# Patient Record
Sex: FEMALE | Race: BLACK OR AFRICAN AMERICAN | Employment: FULL TIME | URBAN - METROPOLITAN AREA
[De-identification: names, ages, dates, MRNs, and addresses within clinical notes are randomized per-mention and may not be internally consistent; named-entity substitution may affect disease eponyms.]

---

## 2017-01-10 ENCOUNTER — OFFICE VISIT (OUTPATIENT)
Dept: INTERNAL MEDICINE CLINIC | Facility: CLINIC | Age: 47
End: 2017-01-10

## 2017-01-10 DIAGNOSIS — M79.669 CALF PAIN, UNSPECIFIED LATERALITY: Primary | ICD-10-CM

## 2017-01-10 NOTE — PROGRESS NOTES
CHIEF COMPLAINT:     No chief complaint on file. HPI:   Cristo Romano is a 42year old female .       Current Outpatient Prescriptions:  DIAZEPAM 5 MG Oral Tab TAKE 1 TABLET BY MOUTH EVERY 8 HOURS AS NEEDED Disp: 30 tablet Rfl: 0   ATORVASTATIN UZAIR Normal delivery 9816,1732,9037,0308     x4   • Anemia    • Ectopic pregnancy    • Migraines    • Brain mass 2009     Reported by patient      Social History:    Smoking Status: Never Smoker                      Smokeless Status: Never Used

## 2017-02-21 ENCOUNTER — TELEPHONE (OUTPATIENT)
Dept: NEUROLOGY | Facility: CLINIC | Age: 47
End: 2017-02-21

## 2017-02-21 ENCOUNTER — OFFICE VISIT (OUTPATIENT)
Dept: NEUROLOGY | Facility: CLINIC | Age: 47
End: 2017-02-21

## 2017-02-21 VITALS
BODY MASS INDEX: 43 KG/M2 | SYSTOLIC BLOOD PRESSURE: 110 MMHG | RESPIRATION RATE: 18 BRPM | WEIGHT: 293 LBS | DIASTOLIC BLOOD PRESSURE: 70 MMHG | HEART RATE: 80 BPM

## 2017-02-21 DIAGNOSIS — Z86.018 S/P RESECTION OF MENINGIOMA: ICD-10-CM

## 2017-02-21 DIAGNOSIS — Z98.890 S/P RESECTION OF MENINGIOMA: ICD-10-CM

## 2017-02-21 DIAGNOSIS — G43.009 MIGRAINE WITHOUT AURA AND WITHOUT STATUS MIGRAINOSUS, NOT INTRACTABLE: Primary | ICD-10-CM

## 2017-02-21 PROCEDURE — 99213 OFFICE O/P EST LOW 20 MIN: CPT | Performed by: OTHER

## 2017-02-21 PROCEDURE — 96372 THER/PROPH/DIAG INJ SC/IM: CPT | Performed by: OTHER

## 2017-02-21 RX ORDER — TOPIRAMATE 50 MG/1
50 TABLET, FILM COATED ORAL 2 TIMES DAILY
Qty: 60 TABLET | Refills: 2 | Status: SHIPPED | OUTPATIENT
Start: 2017-02-21 | End: 2017-05-24

## 2017-02-21 RX ORDER — ONDANSETRON 4 MG/1
4 TABLET, ORALLY DISINTEGRATING ORAL EVERY 8 HOURS PRN
Qty: 30 TABLET | Refills: 1 | Status: SHIPPED | OUTPATIENT
Start: 2017-02-21 | End: 2019-04-25 | Stop reason: ALTCHOICE

## 2017-02-21 RX ORDER — KETOROLAC TROMETHAMINE 30 MG/ML
30 INJECTION, SOLUTION INTRAMUSCULAR; INTRAVENOUS ONCE
Status: COMPLETED | OUTPATIENT
Start: 2017-02-21 | End: 2017-02-21

## 2017-02-21 RX ORDER — DEXAMETHASONE SODIUM PHOSPHATE 10 MG/ML
10 INJECTION, SOLUTION INTRAMUSCULAR; INTRAVENOUS ONCE
Status: COMPLETED | OUTPATIENT
Start: 2017-02-21 | End: 2017-02-21

## 2017-02-21 RX ADMIN — DEXAMETHASONE SODIUM PHOSPHATE 10 MG: 10 INJECTION, SOLUTION INTRAMUSCULAR; INTRAVENOUS at 13:45:00

## 2017-02-21 RX ADMIN — KETOROLAC TROMETHAMINE 30 MG: 30 INJECTION, SOLUTION INTRAMUSCULAR; INTRAVENOUS at 13:46:00

## 2017-02-21 NOTE — PATIENT INSTRUCTIONS
Refill policies:    • Allow 2 business days for refills; controlled substances may take longer.   • Contact your pharmacy at least 5 days prior to running out of medication and have them send an electronic request or submit request through the “request re your physician has recommended that you have a procedure or additional testing performed. [facility]) will contact your insurance carrier to obtain pre-certification or prior authorization.     Unfortunately, IMTIAZ has seen an increas

## 2017-02-21 NOTE — PROGRESS NOTES
HPI:    Patient ID: Angus Prasad is a 42year old female. HPI     Speedy Haines is a 42year old female who presented with complaints of headache. She follows with me for migraine headaches.  She had left parietal meningioma resection done last September 20 Yes           0.0 oz/week       0 Standard drinks or equivalent per week       Comment: social       Review of Systems   Constitutional: Negative. Eyes: Positive for photophobia. Respiratory: Negative. Cardiovascular: Negative.     Gastrointestinal: is warm and dry. Psychiatric:  normal mood and affect. Neurological  Mental status: Awake, alert and oriented to time, place and person. Speech is fluent with intact comprehension, repetition and naming. Memory is intact.   Cranial nerves II- XII: madelaine

## 2017-02-22 NOTE — TELEPHONE ENCOUNTER
Dr. Raf Blanchard uncertain why pt is requesting FMLA, and requests that we call the patient to get more information. Spoke with patient, who states this is an ongoing FMLA, which is due to  in the next 1-2 months.   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

## 2017-02-22 NOTE — TELEPHONE ENCOUNTER
FMLA form initiated and endorsed to Dr. Connor Tomlinson for completed. OV notes from 2/21/17 printed and attached.

## 2017-02-23 NOTE — TELEPHONE ENCOUNTER
Spoke with patient who states she would like completed form mailed to her at her home address. Verified home address. Original copy placed in outgoing mail. Copy of completed FMLA form sent to scanning, along with signed SANDRA.

## 2017-05-08 RX ORDER — VALACYCLOVIR HYDROCHLORIDE 500 MG/1
TABLET, FILM COATED ORAL
Qty: 30 TABLET | Refills: 0 | Status: SHIPPED | OUTPATIENT
Start: 2017-05-08 | End: 2017-06-05

## 2017-05-22 ENCOUNTER — OFFICE VISIT (OUTPATIENT)
Dept: INTERNAL MEDICINE CLINIC | Facility: CLINIC | Age: 47
End: 2017-05-22

## 2017-05-22 VITALS
BODY MASS INDEX: 44 KG/M2 | OXYGEN SATURATION: 99 % | RESPIRATION RATE: 16 BRPM | SYSTOLIC BLOOD PRESSURE: 110 MMHG | DIASTOLIC BLOOD PRESSURE: 80 MMHG | TEMPERATURE: 98 F | WEIGHT: 293 LBS | HEART RATE: 92 BPM

## 2017-05-22 DIAGNOSIS — F43.9 STRESS AT HOME: ICD-10-CM

## 2017-05-22 DIAGNOSIS — R06.02 SOB (SHORTNESS OF BREATH): ICD-10-CM

## 2017-05-22 DIAGNOSIS — R60.0 LOCALIZED EDEMA: Primary | ICD-10-CM

## 2017-05-22 DIAGNOSIS — F41.9 ANXIETY: ICD-10-CM

## 2017-05-22 PROCEDURE — 99214 OFFICE O/P EST MOD 30 MIN: CPT | Performed by: FAMILY MEDICINE

## 2017-05-22 RX ORDER — TRIAMTERENE AND HYDROCHLOROTHIAZIDE 37.5; 25 MG/1; MG/1
1 CAPSULE ORAL EVERY MORNING
Qty: 30 CAPSULE | Refills: 1 | Status: SHIPPED | OUTPATIENT
Start: 2017-05-22 | End: 2019-04-25 | Stop reason: ALTCHOICE

## 2017-05-22 NOTE — PROGRESS NOTES
CHIEF COMPLAINT:     Patient presents with:  Leg Swelling: legs and feet   Headache      HPI:   Bambi Luna is a 42year old female . The patient complains of swelling of the lower legs. Has had on and off for years.  Worsens as the day goes on and is w NEEDED Disp: 30 tablet Rfl: 0   ATORVASTATIN CALCIUM 20 MG Oral Tab TAKE 1 TABLET BY MOUTH NIGHTLY. Disp: 30 tablet Rfl: 0   ergocalciferol 82116 UNITS Oral Cap Take 1 capsule (50,000 Units total) by mouth once a week.  Disp: 12 capsule Rfl: 1   ondansetron normocephalic  EYES: conjunctiva clear, EOM intact, PERRLA  EARS: TM's clear, no bulging, no retraction, no fluid  NOSE: nostrils patent, no exudates, nasal mucosa pink and noninflamed  THROAT: oral mucosa pink, moist. No visible dental caries.  Posterior p

## 2017-05-24 ENCOUNTER — OFFICE VISIT (OUTPATIENT)
Dept: NEUROLOGY | Facility: CLINIC | Age: 47
End: 2017-05-24

## 2017-05-24 VITALS
BODY MASS INDEX: 41.02 KG/M2 | WEIGHT: 293 LBS | DIASTOLIC BLOOD PRESSURE: 84 MMHG | RESPIRATION RATE: 16 BRPM | SYSTOLIC BLOOD PRESSURE: 133 MMHG | HEART RATE: 74 BPM | HEIGHT: 71 IN

## 2017-05-24 DIAGNOSIS — G43.009 MIGRAINE WITHOUT AURA AND WITHOUT STATUS MIGRAINOSUS, NOT INTRACTABLE: Primary | ICD-10-CM

## 2017-05-24 DIAGNOSIS — Z86.018 S/P RESECTION OF MENINGIOMA: ICD-10-CM

## 2017-05-24 DIAGNOSIS — Z98.890 S/P RESECTION OF MENINGIOMA: ICD-10-CM

## 2017-05-24 PROCEDURE — 96372 THER/PROPH/DIAG INJ SC/IM: CPT | Performed by: OTHER

## 2017-05-24 PROCEDURE — 99213 OFFICE O/P EST LOW 20 MIN: CPT | Performed by: OTHER

## 2017-05-24 RX ORDER — KETOROLAC TROMETHAMINE 30 MG/ML
30 INJECTION, SOLUTION INTRAMUSCULAR; INTRAVENOUS ONCE
Status: COMPLETED | OUTPATIENT
Start: 2017-05-24 | End: 2017-05-24

## 2017-05-24 RX ORDER — DEXAMETHASONE SODIUM PHOSPHATE 10 MG/ML
10 INJECTION, SOLUTION INTRAMUSCULAR; INTRAVENOUS ONCE
Status: COMPLETED | OUTPATIENT
Start: 2017-05-24 | End: 2017-05-24

## 2017-05-24 RX ORDER — TOPIRAMATE 50 MG/1
50 TABLET, FILM COATED ORAL 2 TIMES DAILY
Qty: 60 TABLET | Refills: 5 | Status: SHIPPED | OUTPATIENT
Start: 2017-05-24 | End: 2018-02-01

## 2017-05-24 RX ORDER — SUMATRIPTAN 50 MG/1
50 TABLET, FILM COATED ORAL EVERY 2 HOUR PRN
Qty: 9 TABLET | Refills: 0 | Status: SHIPPED | OUTPATIENT
Start: 2017-05-24 | End: 2017-09-14

## 2017-05-24 RX ORDER — ONDANSETRON 4 MG/1
4 TABLET, FILM COATED ORAL EVERY 8 HOURS PRN
Qty: 60 TABLET | Refills: 2 | Status: SHIPPED | OUTPATIENT
Start: 2017-05-24 | End: 2019-02-07

## 2017-05-24 RX ADMIN — DEXAMETHASONE SODIUM PHOSPHATE 10 MG: 10 INJECTION, SOLUTION INTRAMUSCULAR; INTRAVENOUS at 16:37:00

## 2017-05-24 RX ADMIN — KETOROLAC TROMETHAMINE 30 MG: 30 INJECTION, SOLUTION INTRAMUSCULAR; INTRAVENOUS at 16:36:00

## 2017-05-24 NOTE — PROGRESS NOTES
Provided IM injections of toradol and decadron. Pt signed procedure consent form prior to administration. Pt tolerated well.

## 2017-05-25 NOTE — PROGRESS NOTES
HPI:    Patient ID: Robby Benavidez is a 42year old female. Migraine   Associated symptoms include nausea and photophobia. Pertinent negatives include no dizziness, numbness or weakness.         Antonio is a 42year old female who presented for follow up f per week       Comment: social       Review of Systems   Constitutional: Negative. Eyes: Positive for photophobia. Respiratory: Negative. Cardiovascular: Negative. Gastrointestinal: Positive for nausea. Musculoskeletal: Negative.     Skin: Nega developed, well nourished, mild painful distress  Head: s/p left parietal craniotomy  Neck: Normal range of motion. Neck supple. Cardiovascular: Normal rate, regular rhythm and normal heart sounds.     Pulmonary/Chest: Effort normal and breath sounds norm mg tablet 60 tablet 2      Sig: Take 1 tablet (4 mg total) by mouth every 8 (eight) hours as needed for Nausea.            Imaging & Referrals:  None     VR#[id number]

## 2017-06-05 RX ORDER — DIAZEPAM 5 MG/1
TABLET ORAL
Qty: 30 TABLET | Refills: 0 | Status: SHIPPED | OUTPATIENT
Start: 2017-06-05 | End: 2017-09-11

## 2017-06-05 RX ORDER — VALACYCLOVIR HYDROCHLORIDE 500 MG/1
500 TABLET, FILM COATED ORAL
Qty: 30 TABLET | Refills: 0 | Status: SHIPPED | OUTPATIENT
Start: 2017-06-05 | End: 2017-09-08

## 2017-06-05 RX ORDER — ATORVASTATIN CALCIUM 20 MG/1
TABLET, FILM COATED ORAL
Qty: 30 TABLET | Refills: 0 | Status: SHIPPED | OUTPATIENT
Start: 2017-06-05 | End: 2017-09-20

## 2017-07-14 ENCOUNTER — TELEPHONE (OUTPATIENT)
Dept: INTERNAL MEDICINE CLINIC | Facility: CLINIC | Age: 47
End: 2017-07-14

## 2017-07-14 ENCOUNTER — HOSPITAL ENCOUNTER (OUTPATIENT)
Dept: MAMMOGRAPHY | Age: 47
Discharge: HOME | End: 2017-07-14
Attending: FAMILY MEDICINE
Payer: COMMERCIAL

## 2017-07-14 DIAGNOSIS — Z12.31 ENCOUNTER FOR SCREENING MAMMOGRAM FOR BREAST CANCER: Primary | ICD-10-CM

## 2017-07-14 DIAGNOSIS — Z13.820 SCREENING FOR OSTEOPOROSIS: ICD-10-CM

## 2017-07-14 DIAGNOSIS — Z11.3 SCREENING FOR STD (SEXUALLY TRANSMITTED DISEASE): ICD-10-CM

## 2017-07-14 DIAGNOSIS — Z12.31 ENCOUNTER FOR SCREENING MAMMOGRAM FOR BREAST CANCER: ICD-10-CM

## 2017-07-14 DIAGNOSIS — Z00.00 LABORATORY EXAMINATION ORDERED AS PART OF A ROUTINE GENERAL MEDICAL EXAMINATION: ICD-10-CM

## 2017-07-14 DIAGNOSIS — Z12.31 ENCOUNTER FOR SCREENING MAMMOGRAM FOR MALIGNANT NEOPLASM OF BREAST: ICD-10-CM

## 2017-07-14 PROCEDURE — 77067 SCR MAMMO BI INCL CAD: CPT | Performed by: FAMILY MEDICINE

## 2017-07-14 NOTE — TELEPHONE ENCOUNTER
Pt called to request blood work including a prolactin level/ mammogram and bone density. Pt due for mammo, order placed. Advised pt to schedule an ov w Amanda Fullerw and stated will do once tests are completed.    Pt upset as the requested test were not placed

## 2017-07-17 NOTE — TELEPHONE ENCOUNTER
I ordered what STD labs there are but the rest of the STD testing will occur when she comes in for her pap smear, because that testing is done with vaginal cultures.

## 2017-07-17 NOTE — TELEPHONE ENCOUNTER
Pt informed and is also requesting all STD testing, including HIV testing. She also stated that she would like a PAP (did state that she had a hysterectomy but did not state if partial or full). Advised that she would need to come in for OV.      Please

## 2017-09-11 DIAGNOSIS — Z86.018 S/P RESECTION OF MENINGIOMA: ICD-10-CM

## 2017-09-11 DIAGNOSIS — G43.009 MIGRAINE WITHOUT AURA AND WITHOUT STATUS MIGRAINOSUS, NOT INTRACTABLE: ICD-10-CM

## 2017-09-11 DIAGNOSIS — Z98.890 S/P RESECTION OF MENINGIOMA: ICD-10-CM

## 2017-09-11 RX ORDER — VALACYCLOVIR HYDROCHLORIDE 500 MG/1
TABLET, FILM COATED ORAL
Qty: 30 TABLET | Refills: 0 | Status: SHIPPED | OUTPATIENT
Start: 2017-09-11 | End: 2017-09-11

## 2017-09-11 RX ORDER — VALACYCLOVIR HYDROCHLORIDE 500 MG/1
500 TABLET, FILM COATED ORAL
Qty: 30 TABLET | Refills: 3 | Status: SHIPPED
Start: 2017-09-11 | End: 2018-05-08

## 2017-09-11 RX ORDER — DIAZEPAM 5 MG/1
5 TABLET ORAL EVERY 8 HOURS PRN
Qty: 30 TABLET | Refills: 0 | COMMUNITY
Start: 2017-09-11 | End: 2017-10-07

## 2017-09-11 NOTE — TELEPHONE ENCOUNTER
From: Tl Gloss  Sent: 9/11/2017 8:52 AM CDT  Subject: Medication Renewal Request    Osmar Ryan  Dhruv Colon would like a refill of the following medications:  Isometheptene-Dichloral-APAP -325 MG Oral Cap Muriel Sorto MD]    Preferred pharmacy: [facility] [address], [phone], [phone]    Comment:      Medication renewals requested in this message routed separately:  DIAZEPAM 5 MG Oral Tab Precious Banda NP]  VALACYCLOVIR  MG Oral Tab Emil Adame MD]

## 2017-09-11 NOTE — TELEPHONE ENCOUNTER
Faxed Rx script (Diazepam 5mg) to [facility] [address] ([facility] #[phone]).

## 2017-09-11 NOTE — TELEPHONE ENCOUNTER
From: Neil Flores  Sent: 9/11/2017 8:52 AM CDT  Subject: Medication Renewal Request    Mathieu Wlison.  Tonie Ramirez would like a refill of the following medications:  DIAZEPAM 5 MG Oral Tab Wilfrid Solis NP]    Preferred pharmacy: [facility]

## 2017-09-11 NOTE — TELEPHONE ENCOUNTER
From: Jamila Tinoco  Sent: 9/11/2017 8:52 AM CDT  Subject: Medication Renewal Request    Philip Mcwilliams.  [facility] would like a refill of the following medications:  VALACYCLOVIR  MG Oral Tab Donnel Mohs, MD]    Preferred pharmacy: [place] [phone]

## 2017-09-13 NOTE — TELEPHONE ENCOUNTER
From: Mony Irvin  Sent: 9/11/2017 9:19 PM CDT  Subject: Medication Renewal Request    Babs Bone.  Schuyler Cockayne would like a refill of the following medications:  Isometheptene-Dichloral-APAP -325 MG Oral Cap Lynn Mitchell MD]    Preferred pharmacy: [facility]

## 2017-09-13 NOTE — TELEPHONE ENCOUNTER
Medication: Midrin    Date of last refill: 02/21/17  [place]    Last office visit: 05/24/17  Due back to clinic per last office note:  3 months  Date next office visit scheduled: 09/13/17    Last OV note recommendati

## 2017-09-14 ENCOUNTER — TELEPHONE (OUTPATIENT)
Dept: NEUROLOGY | Facility: CLINIC | Age: 47
End: 2017-09-14

## 2017-09-14 ENCOUNTER — OFFICE VISIT (OUTPATIENT)
Dept: NEUROLOGY | Facility: CLINIC | Age: 47
End: 2017-09-14

## 2017-09-14 ENCOUNTER — HOSPITAL ENCOUNTER (OUTPATIENT)
Dept: MAMMOGRAPHY | Age: 47
Discharge: HOME | End: 2017-09-14
Attending: FAMILY MEDICINE
Payer: COMMERCIAL

## 2017-09-14 ENCOUNTER — LAB ENCOUNTER (OUTPATIENT)
Dept: LAB | Age: 47
End: 2017-09-14
Attending: FAMILY MEDICINE
Payer: COMMERCIAL

## 2017-09-14 VITALS
RESPIRATION RATE: 16 BRPM | SYSTOLIC BLOOD PRESSURE: 130 MMHG | DIASTOLIC BLOOD PRESSURE: 80 MMHG | OXYGEN SATURATION: 99 % | HEIGHT: 71 IN | HEART RATE: 76 BPM | BODY MASS INDEX: 41.02 KG/M2 | TEMPERATURE: 98 F | WEIGHT: 293 LBS

## 2017-09-14 DIAGNOSIS — Z98.890 S/P RESECTION OF MENINGIOMA: ICD-10-CM

## 2017-09-14 DIAGNOSIS — Z86.018 S/P RESECTION OF MENINGIOMA: ICD-10-CM

## 2017-09-14 DIAGNOSIS — R92.2 INCONCLUSIVE MAMMOGRAM: ICD-10-CM

## 2017-09-14 DIAGNOSIS — Z00.00 LABORATORY EXAMINATION ORDERED AS PART OF A ROUTINE GENERAL MEDICAL EXAMINATION: ICD-10-CM

## 2017-09-14 DIAGNOSIS — G43.709 CHRONIC MIGRAINE WITHOUT AURA WITHOUT STATUS MIGRAINOSUS, NOT INTRACTABLE: Primary | ICD-10-CM

## 2017-09-14 DIAGNOSIS — Z11.3 SCREENING FOR STD (SEXUALLY TRANSMITTED DISEASE): ICD-10-CM

## 2017-09-14 LAB
25-HYDROXYVITAMIN D (TOTAL): 21.1 NG/ML (ref 30–100)
ALBUMIN SERPL-MCNC: 3.3 G/DL (ref 3.5–4.8)
ALP LIVER SERPL-CCNC: 70 U/L (ref 37–98)
ALT SERPL-CCNC: 24 U/L (ref 14–54)
AST SERPL-CCNC: 16 U/L (ref 15–41)
BASOPHILS # BLD AUTO: 0.02 X10(3) UL (ref 0–0.1)
BASOPHILS NFR BLD AUTO: 0.4 %
BILIRUB SERPL-MCNC: 0.3 MG/DL (ref 0.1–2)
BUN BLD-MCNC: 11 MG/DL (ref 8–20)
CALCIUM BLD-MCNC: 8.8 MG/DL (ref 8.3–10.3)
CHLORIDE: 107 MMOL/L (ref 101–111)
CHOLEST SMN-MCNC: 228 MG/DL (ref ?–200)
CO2: 25 MMOL/L (ref 22–32)
CREAT BLD-MCNC: 0.79 MG/DL (ref 0.55–1.02)
EOSINOPHIL # BLD AUTO: 0.11 X10(3) UL (ref 0–0.3)
EOSINOPHIL NFR BLD AUTO: 2.4 %
ERYTHROCYTE [DISTWIDTH] IN BLOOD BY AUTOMATED COUNT: 12.6 % (ref 11.5–16)
EST. AVERAGE GLUCOSE BLD GHB EST-MCNC: 126 MG/DL (ref 68–126)
GLUCOSE BLD-MCNC: 97 MG/DL (ref 70–99)
HAV AB SERPL IA-ACNC: 806 PG/ML (ref 193–986)
HBA1C MFR BLD HPLC: 6 % (ref ?–5.7)
HCT VFR BLD AUTO: 38.7 % (ref 34–50)
HDLC SERPL-MCNC: 57 MG/DL (ref 45–?)
HDLC SERPL: 4 {RATIO} (ref ?–4.44)
HGB BLD-MCNC: 12.1 G/DL (ref 12–16)
IMMATURE GRANULOCYTE COUNT: 0.01 X10(3) UL (ref 0–1)
IMMATURE GRANULOCYTE RATIO %: 0.2 %
LDLC SERPL CALC-MCNC: 151 MG/DL (ref ?–130)
LDLC SERPL-MCNC: 20 MG/DL (ref 5–40)
LYMPHOCYTES # BLD AUTO: 2.33 X10(3) UL (ref 0.9–4)
LYMPHOCYTES NFR BLD AUTO: 50.8 %
M PROTEIN MFR SERPL ELPH: 7.5 G/DL (ref 6.1–8.3)
MCH RBC QN AUTO: 28.1 PG (ref 27–33.2)
MCHC RBC AUTO-ENTMCNC: 31.3 G/DL (ref 31–37)
MCV RBC AUTO: 90 FL (ref 81–100)
MONOCYTES # BLD AUTO: 0.35 X10(3) UL (ref 0.1–0.6)
MONOCYTES NFR BLD AUTO: 7.6 %
NEUTROPHIL ABS PRELIM: 1.77 X10 (3) UL (ref 1.3–6.7)
NEUTROPHILS # BLD AUTO: 1.77 X10(3) UL (ref 1.3–6.7)
NEUTROPHILS NFR BLD AUTO: 38.6 %
NONHDLC SERPL-MCNC: 171 MG/DL (ref ?–130)
PLATELET # BLD AUTO: 286 10(3)UL (ref 150–450)
POTASSIUM SERPL-SCNC: 3.9 MMOL/L (ref 3.6–5.1)
PROLACTIN: 90 NG/ML
RBC # BLD AUTO: 4.3 X10(6)UL (ref 3.8–5.1)
RED CELL DISTRIBUTION WIDTH-SD: 41.1 FL (ref 35.1–46.3)
SODIUM SERPL-SCNC: 140 MMOL/L (ref 136–144)
TRIGLYCERIDES: 98 MG/DL (ref ?–150)
TSI SER-ACNC: 2.53 MIU/ML (ref 0.35–5.5)
WBC # BLD AUTO: 4.6 X10(3) UL (ref 4–13)

## 2017-09-14 PROCEDURE — 80053 COMPREHEN METABOLIC PANEL: CPT

## 2017-09-14 PROCEDURE — 85025 COMPLETE CBC W/AUTO DIFF WBC: CPT

## 2017-09-14 PROCEDURE — 86696 HERPES SIMPLEX TYPE 2 TEST: CPT

## 2017-09-14 PROCEDURE — 80061 LIPID PANEL: CPT

## 2017-09-14 PROCEDURE — 82306 VITAMIN D 25 HYDROXY: CPT

## 2017-09-14 PROCEDURE — 84443 ASSAY THYROID STIM HORMONE: CPT

## 2017-09-14 PROCEDURE — 77061 BREAST TOMOSYNTHESIS UNI: CPT | Performed by: FAMILY MEDICINE

## 2017-09-14 PROCEDURE — 82607 VITAMIN B-12: CPT

## 2017-09-14 PROCEDURE — 86695 HERPES SIMPLEX TYPE 1 TEST: CPT

## 2017-09-14 PROCEDURE — 77065 DX MAMMO INCL CAD UNI: CPT | Performed by: FAMILY MEDICINE

## 2017-09-14 PROCEDURE — 99213 OFFICE O/P EST LOW 20 MIN: CPT | Performed by: OTHER

## 2017-09-14 PROCEDURE — 87389 HIV-1 AG W/HIV-1&-2 AB AG IA: CPT

## 2017-09-14 PROCEDURE — 84146 ASSAY OF PROLACTIN: CPT

## 2017-09-14 PROCEDURE — 86592 SYPHILIS TEST NON-TREP QUAL: CPT

## 2017-09-14 PROCEDURE — 83036 HEMOGLOBIN GLYCOSYLATED A1C: CPT

## 2017-09-14 PROCEDURE — 36415 COLL VENOUS BLD VENIPUNCTURE: CPT

## 2017-09-14 RX ORDER — SUMATRIPTAN 50 MG/1
50 TABLET, FILM COATED ORAL EVERY 2 HOUR PRN
Qty: 9 TABLET | Refills: 3 | Status: SHIPPED | OUTPATIENT
Start: 2017-09-14 | End: 2018-09-19

## 2017-09-14 NOTE — PROGRESS NOTES
HPI:    Patient ID: Reddy Patino is a 41year old female. Migraine    Pertinent negatives include no dizziness, nausea, numbness, photophobia or weakness. Maximiliano Pham is a 41year old female who presented for follow up for migraine headaches.  She sta social       Review of Systems   Constitutional: Negative. Eyes: Negative for photophobia. Respiratory: Negative. Cardiovascular: Negative. Gastrointestinal: Negative for nausea. Musculoskeletal: Negative. Skin: Negative.     Neurological: P reviewed  General: well developed, well nourished, mild painful distress  Head: s/p left parietal craniotomy  Neck: Normal range of motion. Neck supple. Cardiovascular: Normal rate, regular rhythm and normal heart sounds.     Pulmonary/Chest: Effort wilber

## 2017-09-14 NOTE — TELEPHONE ENCOUNTER
Rx printed and signed by Dr. Andrew Gonzalez. Faxed to preferred pharmacy at above number. Fax confirmation received.

## 2017-09-15 LAB
HSV 1 GLYCOPROTEIN G AB, IGG: 34.3 IV
HSV 2 GLYCOPROTEIN G AB, IGG: 20.9 IV
RPR SER QL: NONREACTIVE

## 2017-09-19 ENCOUNTER — TELEPHONE (OUTPATIENT)
Dept: INTERNAL MEDICINE CLINIC | Facility: CLINIC | Age: 47
End: 2017-09-19

## 2017-09-19 DIAGNOSIS — E55.9 VITAMIN D DEFICIENCY: Primary | ICD-10-CM

## 2017-09-19 DIAGNOSIS — Z71.3 WEIGHT LOSS COUNSELING, ENCOUNTER FOR: ICD-10-CM

## 2017-09-19 RX ORDER — ERGOCALCIFEROL 1.25 MG/1
50000 CAPSULE ORAL WEEKLY
Qty: 12 CAPSULE | Refills: 1 | Status: SHIPPED | OUTPATIENT
Start: 2017-09-19 | End: 2018-05-08 | Stop reason: ALTCHOICE

## 2017-09-19 NOTE — TELEPHONE ENCOUNTER
----- Message from Amanda Daley NP sent at 9/15/2017  7:28 PM CDT -----  HSV type 1 and  2 are positive. Pt already aware of her herpes hx. RPR wnl.

## 2017-09-19 NOTE — TELEPHONE ENCOUNTER
----- Message from Jose Enrique Sam NP sent at 9/14/2017  1:09 PM CDT -----  Chol and Ldl  Improving. Continue to follow a low fat diet, and take your medication. Vit D improving but still low.  Pt still needs  needs vit D 57557 IU once a week for 6 months , a

## 2017-09-20 NOTE — TELEPHONE ENCOUNTER
Pt is going to work on walking and diet since numbers improved being off meds 3 months and not restart at this time

## 2017-09-26 RX ORDER — SUMATRIPTAN 50 MG/1
TABLET, FILM COATED ORAL
Qty: 9 TABLET | Refills: 0 | OUTPATIENT
Start: 2017-09-26

## 2017-09-26 NOTE — TELEPHONE ENCOUNTER
Checked with pharmacy if patient filled below RX.  Below refill is still on file so may disregard current request.  Medication: Imitrex    Date of last refill: 9/14/17  [place]    Last office visit: 9/14/17  Due back t

## 2017-10-07 ENCOUNTER — OFFICE VISIT (OUTPATIENT)
Dept: INTERNAL MEDICINE CLINIC | Facility: CLINIC | Age: 47
End: 2017-10-07

## 2017-10-07 VITALS
SYSTOLIC BLOOD PRESSURE: 120 MMHG | DIASTOLIC BLOOD PRESSURE: 82 MMHG | HEIGHT: 71 IN | RESPIRATION RATE: 12 BRPM | WEIGHT: 293 LBS | TEMPERATURE: 98 F | OXYGEN SATURATION: 98 % | HEART RATE: 69 BPM | BODY MASS INDEX: 41.02 KG/M2

## 2017-10-07 DIAGNOSIS — F41.9 ANXIETY: ICD-10-CM

## 2017-10-07 DIAGNOSIS — Z23 NEED FOR INFLUENZA VACCINATION: ICD-10-CM

## 2017-10-07 DIAGNOSIS — Z00.00 ROUTINE GENERAL MEDICAL EXAMINATION AT A HEALTH CARE FACILITY: Primary | ICD-10-CM

## 2017-10-07 PROCEDURE — 90686 IIV4 VACC NO PRSV 0.5 ML IM: CPT | Performed by: FAMILY MEDICINE

## 2017-10-07 PROCEDURE — 90471 IMMUNIZATION ADMIN: CPT | Performed by: FAMILY MEDICINE

## 2017-10-07 PROCEDURE — 99396 PREV VISIT EST AGE 40-64: CPT | Performed by: FAMILY MEDICINE

## 2017-10-07 PROCEDURE — 99212 OFFICE O/P EST SF 10 MIN: CPT | Performed by: FAMILY MEDICINE

## 2017-10-07 RX ORDER — DIAZEPAM 5 MG/1
5 TABLET ORAL EVERY 12 HOURS PRN
Qty: 30 TABLET | Refills: 1 | Status: SHIPPED | OUTPATIENT
Start: 2017-10-07 | End: 2018-04-05

## 2017-10-07 RX ORDER — QUINIDINE SULFATE 200 MG
1 TABLET ORAL DAILY
COMMUNITY
End: 2019-04-25 | Stop reason: ALTCHOICE

## 2017-10-07 NOTE — PROGRESS NOTES
HPI:   Jamila Tinoco is a 41year old female who presents for a complete physical exam. Symptoms: denies discharge, itching, burning or dysuria, is S/P MAHIN, ovaries preserved. Regular SBE- yes,Sexually active- yes,  Contraception- 12/15/13 hystectomy.  Milad Gutierrez Date   HDL 57 09/14/2017   HDL 53 05/06/2016   HDL 56 09/10/2015       Lab Results  Component Value Date    (H) 09/14/2017    (H) 05/06/2016    (H) 09/10/2015       Lab Results  Component Value Date   AST 16 09/14/2017   AST 18 07/30/2 2009    Reported by patient   • Ectopic pregnancy    • Lipid screening 4/14/2012   • Meningioma ([facility] [address] 2008   • Migraines    • Normal delivery 6464,6278,2206,1293    x4   • Other, multiple and ill-defined closed fractures of lower limb as a child    Rt foot allergy or asthma    EXAM:   /82 (BP Location: Left arm, Patient Position: Sitting, Cuff Size: large)   Pulse 69   Temp 97.9 °F (36.6 °C) (Oral)   Resp 12   Ht 71\"   Wt (!) 314 lb 8 oz   LMP 10/02/2013   SpO2 98%   Breastfeeding?  No   BMI 43.86 kg/m Visit:  Signed Prescriptions Disp Refills    diazepam 5 MG Oral Tab 30 tablet 1      Sig: Take 1 tablet (5 mg total) by mouth every 12 (twelve) hours as needed.            Imaging & Consults:  FLULAVAL INFLUENZA VACCINE QUAD PRESERVATIVE FREE 0.5 ML

## 2017-12-20 ENCOUNTER — TELEPHONE (OUTPATIENT)
Dept: NEUROLOGY | Facility: CLINIC | Age: 47
End: 2017-12-20

## 2017-12-20 NOTE — TELEPHONE ENCOUNTER
Spoke with patient. Pt agreeable to scheduling a nurse appt for injections.   Accepted appt on 12/21/17 at [address] [place] [address].

## 2017-12-20 NOTE — TELEPHONE ENCOUNTER
Patient contacting office back stating she is still not feeling any better and would like to know what to do.

## 2017-12-20 NOTE — TELEPHONE ENCOUNTER
Patient states she has had a migraine with nausea and photosensitivity x 2 days. Also, c/o \"loose bowels\". States she has been lying in a dark room, taking her Topamax as directed and Imitrex all without relief.  Patient does not know if she has tried MDP

## 2017-12-21 ENCOUNTER — NURSE ONLY (OUTPATIENT)
Dept: NEUROLOGY | Facility: CLINIC | Age: 47
End: 2017-12-21

## 2017-12-21 DIAGNOSIS — G43.009 MIGRAINE WITHOUT AURA AND WITHOUT STATUS MIGRAINOSUS, NOT INTRACTABLE: Primary | ICD-10-CM

## 2017-12-21 PROCEDURE — 96372 THER/PROPH/DIAG INJ SC/IM: CPT | Performed by: OTHER

## 2017-12-21 RX ORDER — KETOROLAC TROMETHAMINE 30 MG/ML
30 INJECTION, SOLUTION INTRAMUSCULAR; INTRAVENOUS ONCE
Status: COMPLETED | OUTPATIENT
Start: 2017-12-21 | End: 2017-12-21

## 2017-12-21 RX ORDER — DEXAMETHASONE SODIUM PHOSPHATE 10 MG/ML
10 INJECTION, SOLUTION INTRAMUSCULAR; INTRAVENOUS ONCE
Status: COMPLETED | OUTPATIENT
Start: 2017-12-21 | End: 2017-12-21

## 2017-12-21 RX ADMIN — KETOROLAC TROMETHAMINE 30 MG: 30 INJECTION, SOLUTION INTRAMUSCULAR; INTRAVENOUS at 09:43:00

## 2017-12-21 RX ADMIN — DEXAMETHASONE SODIUM PHOSPHATE 10 MG: 10 INJECTION, SOLUTION INTRAMUSCULAR; INTRAVENOUS at 09:40:00

## 2017-12-21 NOTE — PROGRESS NOTES
Patient presented for Decadron/Toradol injections. Explained procedure to pt, consent signed by her. Injections given without issue, see documentation for administration sites. Instructed pt to lay/sit in room for a few minutes.  Pt left office a few min

## 2018-01-02 ENCOUNTER — TELEPHONE (OUTPATIENT)
Dept: INTERNAL MEDICINE CLINIC | Facility: CLINIC | Age: 48
End: 2018-01-02

## 2018-01-02 NOTE — TELEPHONE ENCOUNTER
Patient called to give update that her \"incision has reopened\" needs to discuss with nurse further

## 2018-01-02 NOTE — TELEPHONE ENCOUNTER
Pt called to have Kaykay informed about incision from hysterectomy. Pt has an upcoming ov w specialist this thursday at [facility].

## 2018-02-01 ENCOUNTER — OFFICE VISIT (OUTPATIENT)
Dept: NEUROLOGY | Facility: CLINIC | Age: 48
End: 2018-02-01

## 2018-02-01 VITALS
HEART RATE: 94 BPM | SYSTOLIC BLOOD PRESSURE: 130 MMHG | DIASTOLIC BLOOD PRESSURE: 80 MMHG | BODY MASS INDEX: 41.02 KG/M2 | RESPIRATION RATE: 18 BRPM | OXYGEN SATURATION: 99 % | HEIGHT: 71 IN | WEIGHT: 293 LBS | TEMPERATURE: 98 F

## 2018-02-01 DIAGNOSIS — Z98.890 S/P RESECTION OF MENINGIOMA: ICD-10-CM

## 2018-02-01 DIAGNOSIS — G43.009 MIGRAINE WITHOUT AURA AND WITHOUT STATUS MIGRAINOSUS, NOT INTRACTABLE: ICD-10-CM

## 2018-02-01 DIAGNOSIS — Z86.018 S/P RESECTION OF MENINGIOMA: ICD-10-CM

## 2018-02-01 DIAGNOSIS — G43.709 CHRONIC MIGRAINE W/O AURA W/O STATUS MIGRAINOSUS, NOT INTRACTABLE: Primary | ICD-10-CM

## 2018-02-01 PROCEDURE — 99213 OFFICE O/P EST LOW 20 MIN: CPT | Performed by: OTHER

## 2018-02-01 RX ORDER — TOPIRAMATE 50 MG/1
50 TABLET, FILM COATED ORAL 2 TIMES DAILY
Qty: 60 TABLET | Refills: 5 | Status: SHIPPED | OUTPATIENT
Start: 2018-02-01 | End: 2019-02-07

## 2018-02-01 NOTE — PROGRESS NOTES
HPI:    Patient ID: Dante Peck is a 41year old female. Migraine    Pertinent negatives include no dizziness, nausea, numbness, photophobia or weakness. [facility] is a 41year old female who presented for follow up for migraine headaches.  She sta Alcohol use: Yes              Comment: occ       Review of Systems   Constitutional: Negative. Eyes: Negative for photophobia. Respiratory: Negative. Cardiovascular: Negative. Gastrointestinal: Negative for nausea.    Musculoske left parietal craniotomy  Neck: Normal range of motion. Neck supple. Cardiovascular: Normal rate, regular rhythm and normal heart sounds. Pulmonary/Chest: Effort normal and breath sounds normal.   Abdominal: Soft.  Bowel sounds are normal.   Skin: Skin

## 2018-02-05 ENCOUNTER — TELEPHONE (OUTPATIENT)
Dept: NEUROLOGY | Facility: CLINIC | Age: 48
End: 2018-02-05

## 2018-02-05 NOTE — TELEPHONE ENCOUNTER
During office visit on 18, pt requested new FMLA form be filled out for intermittent leave due to dizziness with migraines. Form completed and endorsed to Dr. June Santoyo for review and signature. Current SANDRA on file has .   Require new SANDRA fr

## 2018-04-05 ENCOUNTER — TELEPHONE (OUTPATIENT)
Dept: INTERNAL MEDICINE CLINIC | Facility: CLINIC | Age: 48
End: 2018-04-05

## 2018-04-05 RX ORDER — DIAZEPAM 5 MG/1
5 TABLET ORAL EVERY 12 HOURS PRN
Qty: 30 TABLET | Refills: 0 | COMMUNITY
Start: 2018-04-05 | End: 2019-04-25 | Stop reason: ALTCHOICE

## 2018-05-08 ENCOUNTER — OFFICE VISIT (OUTPATIENT)
Dept: INTERNAL MEDICINE CLINIC | Facility: CLINIC | Age: 48
End: 2018-05-08

## 2018-05-08 ENCOUNTER — APPOINTMENT (OUTPATIENT)
Dept: LAB | Age: 48
End: 2018-05-08
Attending: FAMILY MEDICINE
Payer: COMMERCIAL

## 2018-05-08 VITALS
RESPIRATION RATE: 16 BRPM | OXYGEN SATURATION: 98 % | SYSTOLIC BLOOD PRESSURE: 114 MMHG | TEMPERATURE: 98 F | HEART RATE: 80 BPM | BODY MASS INDEX: 44 KG/M2 | WEIGHT: 293 LBS | DIASTOLIC BLOOD PRESSURE: 76 MMHG

## 2018-05-08 DIAGNOSIS — R59.9 ENLARGED LYMPH NODE: ICD-10-CM

## 2018-05-08 DIAGNOSIS — B00.9 HERPES SIMPLEX VIRUS (HSV) INFECTION: ICD-10-CM

## 2018-05-08 DIAGNOSIS — N89.8 VAGINAL DRYNESS: ICD-10-CM

## 2018-05-08 DIAGNOSIS — N83.209 CYST OF OVARY, UNSPECIFIED LATERALITY: ICD-10-CM

## 2018-05-08 DIAGNOSIS — N89.8 VAGINAL DRYNESS: Primary | ICD-10-CM

## 2018-05-08 DIAGNOSIS — L65.9 HAIR LOSS: ICD-10-CM

## 2018-05-08 DIAGNOSIS — E55.9 VITAMIN D DEFICIENCY: ICD-10-CM

## 2018-05-08 PROCEDURE — 36415 COLL VENOUS BLD VENIPUNCTURE: CPT | Performed by: FAMILY MEDICINE

## 2018-05-08 PROCEDURE — 84443 ASSAY THYROID STIM HORMONE: CPT | Performed by: FAMILY MEDICINE

## 2018-05-08 PROCEDURE — 83002 ASSAY OF GONADOTROPIN (LH): CPT | Performed by: FAMILY MEDICINE

## 2018-05-08 PROCEDURE — 99214 OFFICE O/P EST MOD 30 MIN: CPT | Performed by: FAMILY MEDICINE

## 2018-05-08 PROCEDURE — 82306 VITAMIN D 25 HYDROXY: CPT | Performed by: FAMILY MEDICINE

## 2018-05-08 PROCEDURE — 83001 ASSAY OF GONADOTROPIN (FSH): CPT | Performed by: FAMILY MEDICINE

## 2018-05-08 RX ORDER — SUMATRIPTAN 50 MG/1
50 TABLET, FILM COATED ORAL EVERY 2 HOUR PRN
COMMUNITY
End: 2019-04-25

## 2018-05-08 RX ORDER — VALACYCLOVIR HYDROCHLORIDE 500 MG/1
500 TABLET, FILM COATED ORAL
Qty: 90 TABLET | Refills: 1 | Status: SHIPPED | OUTPATIENT
Start: 2018-05-08 | End: 2019-03-07

## 2018-05-08 NOTE — PROGRESS NOTES
CHIEF COMPLAINT:     Patient presents with:  Menopause  Medication Request: Valtrex 500mg (90-day supply)      HPI:   Hollis Gallardo is a 41year old female . Pt is here for menopausal symptoms.  Pt has been experiencing moodiness, decreased sex drive, vag Reported by patient   • Ectopic pregnancy    • Lipid screening 4/14/2012   • Meningioma ([facility] [address] 2008   • Migraines    • Normal delivery 2399,9437,9642,2401    x4   • Other, multiple and ill-defined closed fractures of lower limb as a child    Rt foot   • Uns Hair loss  (primary encounter diagnosis)  Vaginal dryness  Cyst of ovary, unspecified laterality  Herpes simplex virus (hsv) infection      Orders Placed This Encounter      VITAMIN D, 25-HYDROXY      TSH      FSH      LH (LUTEINIZING HORMONE)    Meds

## 2018-05-10 DIAGNOSIS — E55.9 VITAMIN D DEFICIENCY: Primary | ICD-10-CM

## 2018-05-10 RX ORDER — ERGOCALCIFEROL 1.25 MG/1
50000 CAPSULE ORAL WEEKLY
Qty: 12 CAPSULE | Refills: 1 | Status: SHIPPED | OUTPATIENT
Start: 2018-05-10 | End: 2021-04-08

## 2018-09-19 DIAGNOSIS — G43.111 MIGRAINE WITH AURA, WITH INTRACTABLE MIGRAINE, SO STATED, WITH STATUS MIGRAINOSUS: Primary | ICD-10-CM

## 2018-09-20 RX ORDER — SUMATRIPTAN 50 MG/1
TABLET, FILM COATED ORAL
Qty: 9 TABLET | Refills: 0 | Status: SHIPPED | OUTPATIENT
Start: 2018-09-20 | End: 2021-10-20 | Stop reason: ALTCHOICE

## 2018-09-20 RX ORDER — TRIAMTERENE AND HYDROCHLOROTHIAZIDE 37.5; 25 MG/1; MG/1
CAPSULE ORAL
Qty: 30 CAPSULE | Refills: 0 | OUTPATIENT
Start: 2018-09-20

## 2018-09-20 NOTE — TELEPHONE ENCOUNTER
Called patient and states she does not need this medication refilled, please disregard. Patient was only taking this med short-term last year dt leg swelling.

## 2019-01-21 RX ORDER — VALACYCLOVIR HYDROCHLORIDE 500 MG/1
TABLET, FILM COATED ORAL
Qty: 30 TABLET | Refills: 0 | Status: SHIPPED | OUTPATIENT
Start: 2019-01-21 | End: 2019-02-07

## 2019-01-21 NOTE — TELEPHONE ENCOUNTER
Valacyclovir approved per protocol  Last OV relevant to medication: 5/8/18  Last refill date: 5/8/18  #/refills: 1  When pt was asked to return for OV: none  Upcoming appt/reason: 0  Recent labs: 5-8-18: LH/ FSH/ Assay, thyroid/ Vit. D

## 2019-02-06 DIAGNOSIS — Z86.018 S/P RESECTION OF MENINGIOMA: ICD-10-CM

## 2019-02-06 DIAGNOSIS — G43.009 MIGRAINE WITHOUT AURA AND WITHOUT STATUS MIGRAINOSUS, NOT INTRACTABLE: ICD-10-CM

## 2019-02-06 DIAGNOSIS — Z98.890 S/P RESECTION OF MENINGIOMA: ICD-10-CM

## 2019-02-06 RX ORDER — TOPIRAMATE 50 MG/1
TABLET, FILM COATED ORAL
Qty: 60 TABLET | Refills: 0 | Status: CANCELLED | OUTPATIENT
Start: 2019-02-06

## 2019-02-06 NOTE — TELEPHONE ENCOUNTER
Sent the patient a [employer] message to schedule an appointment due to the patient not being seen in over year.      Medication: TOPIRAMATE 50 MG Oral Tab    Date of last refill: 02/01/18 (#60/5)  [place]    Last office

## 2019-02-07 ENCOUNTER — OFFICE VISIT (OUTPATIENT)
Dept: NEUROLOGY | Facility: CLINIC | Age: 49
End: 2019-02-07
Payer: COMMERCIAL

## 2019-02-07 VITALS
DIASTOLIC BLOOD PRESSURE: 74 MMHG | RESPIRATION RATE: 16 BRPM | HEIGHT: 71 IN | OXYGEN SATURATION: 97 % | HEART RATE: 96 BPM | BODY MASS INDEX: 41.02 KG/M2 | WEIGHT: 293 LBS | SYSTOLIC BLOOD PRESSURE: 130 MMHG | TEMPERATURE: 98 F

## 2019-02-07 DIAGNOSIS — Z98.890 S/P RESECTION OF MENINGIOMA: ICD-10-CM

## 2019-02-07 DIAGNOSIS — G43.009 MIGRAINE WITHOUT AURA AND WITHOUT STATUS MIGRAINOSUS, NOT INTRACTABLE: ICD-10-CM

## 2019-02-07 DIAGNOSIS — Z86.018 S/P RESECTION OF MENINGIOMA: ICD-10-CM

## 2019-02-07 PROCEDURE — 99213 OFFICE O/P EST LOW 20 MIN: CPT | Performed by: OTHER

## 2019-02-07 PROCEDURE — 96372 THER/PROPH/DIAG INJ SC/IM: CPT | Performed by: OTHER

## 2019-02-07 RX ORDER — KETOROLAC TROMETHAMINE 30 MG/ML
30 INJECTION, SOLUTION INTRAMUSCULAR; INTRAVENOUS ONCE
Status: COMPLETED | OUTPATIENT
Start: 2019-02-07 | End: 2019-02-07

## 2019-02-07 RX ORDER — METHYLPREDNISOLONE 4 MG/1
TABLET ORAL
Qty: 1 PACKAGE | Refills: 0 | Status: SHIPPED | OUTPATIENT
Start: 2019-02-07 | End: 2019-04-25 | Stop reason: ALTCHOICE

## 2019-02-07 RX ORDER — TOPIRAMATE 50 MG/1
50 TABLET, FILM COATED ORAL 2 TIMES DAILY
Qty: 60 TABLET | Refills: 2 | Status: SHIPPED | OUTPATIENT
Start: 2019-02-07 | End: 2020-01-29

## 2019-02-07 RX ADMIN — KETOROLAC TROMETHAMINE 30 MG: 30 INJECTION, SOLUTION INTRAMUSCULAR; INTRAVENOUS at 11:16:00

## 2019-02-07 NOTE — PROGRESS NOTES
HPI:    Patient ID: Reddy Patino is a 57year old female. HPI   Patient presented for follow up migraines. Last seen Feb 2018. Headache got worse couple months ago but past week had increased headaches due to weather changes.  She has been on Topamax REPEAT ONE AFTER 2 HOURS, ONLY 2 TABLETS IN 24 HOURS MAX Disp: 9 tablet Rfl: 0   ergocalciferol 62962 units Oral Cap Take 1 capsule (50,000 Units total) by mouth once a week.  Disp: 12 capsule Rfl: 1   SUMAtriptan Succinate 50 MG Oral Tab Take 50 mg by mout alert and oriented to time, place and person. Speech is fluent with intact comprehension, repetition and naming. Normal attention and memory.   Cranial nerves 2-12: grossly intact  Sensory : Intact to all modalities including light touch, pinprick, vibratio

## 2019-02-12 ENCOUNTER — TELEPHONE (OUTPATIENT)
Dept: NEUROLOGY | Facility: CLINIC | Age: 49
End: 2019-02-12

## 2019-02-12 DIAGNOSIS — G43.009 MIGRAINE WITHOUT AURA AND WITHOUT STATUS MIGRAINOSUS, NOT INTRACTABLE: Primary | ICD-10-CM

## 2019-02-12 NOTE — TELEPHONE ENCOUNTER
Patient to start Emgality for migraines. While in office, patient completed an Emgality questionnaire. Rx sent directly to preferred pharmacy. RN discussed injection technique with patient.   Patient given following Emgality handouts:  Getting Started br

## 2019-02-15 NOTE — TELEPHONE ENCOUNTER
Patient informed 2 RX were sent to pharmacy and one of them was for the initial dose of 2 pens. Advised patient call pharmacy and have them double check if they have the initial dose RX. If not, to call us back. Patient verbalizes understanding.

## 2019-02-15 NOTE — TELEPHONE ENCOUNTER
Patient states pharmacy deleted initial dose thinking it was a duplicate. Gave verbal to pharmacy for loading dose.

## 2019-02-18 NOTE — TELEPHONE ENCOUNTER
Called patient and left message on verified voicemail ([phone] Jia Robertson per HIPPA) notifying patient of below approval status. Encouraged patient to call IMTIAZ with any questions or concerns.

## 2019-02-18 NOTE — TELEPHONE ENCOUNTER
Authorized    [id number];GZKGUM:[id number]; Review Type:Prior Auth; Coverage Start Date:01/19/2019; Coverage End Date:02/18/2020;

## 2019-03-07 DIAGNOSIS — G43.009 MIGRAINE WITHOUT AURA AND WITHOUT STATUS MIGRAINOSUS, NOT INTRACTABLE: Primary | ICD-10-CM

## 2019-03-07 RX ORDER — ONDANSETRON 4 MG/1
4 TABLET, FILM COATED ORAL EVERY 8 HOURS PRN
Qty: 30 TABLET | Refills: 1 | Status: SHIPPED | OUTPATIENT
Start: 2019-03-07 | End: 2021-10-05

## 2019-03-07 RX ORDER — VALACYCLOVIR HYDROCHLORIDE 500 MG/1
TABLET, FILM COATED ORAL
Qty: 30 TABLET | Refills: 0 | Status: SHIPPED | OUTPATIENT
Start: 2019-03-07 | End: 2019-04-03

## 2019-03-18 RX ORDER — GALCANEZUMAB 120 MG/ML
INJECTION, SOLUTION SUBCUTANEOUS
Refills: 0 | OUTPATIENT
Start: 2019-03-18

## 2019-03-18 NOTE — TELEPHONE ENCOUNTER
This refill request was for loading dose. Spoke with pharmacist who states has Rx for maintainance dose on file which will be filled.       Medication: EMGALITY 120 MG/ML Subcutaneous Solution Auto-injector

## 2019-03-20 ENCOUNTER — TELEPHONE (OUTPATIENT)
Dept: INTERNAL MEDICINE CLINIC | Facility: CLINIC | Age: 49
End: 2019-03-20

## 2019-03-20 NOTE — TELEPHONE ENCOUNTER
Pt would like a call back wants to talk to nurse about a wound she has did not want to be specific states its personal

## 2019-03-20 NOTE — TELEPHONE ENCOUNTER
Pt has a are that opened up in her umbilical area . Happens once a year and can not get into the wound clinic till Monday ( they treated this is the past ) last 3 days pain in area and pus filled are built up and just burst draining bloody yellow discharge

## 2019-04-03 RX ORDER — VALACYCLOVIR HYDROCHLORIDE 500 MG/1
TABLET, FILM COATED ORAL
Qty: 30 TABLET | Refills: 0 | Status: SHIPPED | OUTPATIENT
Start: 2019-04-03 | End: 2019-05-15

## 2019-04-03 NOTE — TELEPHONE ENCOUNTER
Vallacyclovir approved  Last OV relevant to medication: 5-8-19  Last refill date: 3-7-19  #/refills: 0  When pt was asked to return for OV: none  Upcoming appt/reason: none  Recent labs: 5-8-18: LH/ FSH/ Thyroid/ Vit. D

## 2019-04-18 DIAGNOSIS — G43.009 MIGRAINE WITHOUT AURA AND WITHOUT STATUS MIGRAINOSUS, NOT INTRACTABLE: ICD-10-CM

## 2019-04-18 RX ORDER — GALCANEZUMAB 120 MG/ML
INJECTION, SOLUTION SUBCUTANEOUS
Refills: 0 | OUTPATIENT
Start: 2019-04-18

## 2019-04-18 NOTE — TELEPHONE ENCOUNTER
Spoke with pharmacist who states patient picked up loading dose 2/15 and single refill 3/18. Rx denied for loading dose. Spoke with patient in regards to overdue f/u visit. Pino't scheduled 4/25. Rx denied for single dose syringe as patient was trained with auto injector. Rx pended for auto injector. Medication: EMGALITY 120 MG/ML Subcutaneous    Date of last refill: 2/13/19 (#2/1)  [place]     Last office visit: 2/7/2019  Due back to clinic per last office note:  1 month  Date next office visit scheduled:  No future appointments. Last OV note recommendation: Per Dr. Vu Tyler MD:    Worsening migraines. Topamax not working  Will try [facility].  Advised to come to [facility] for paper work  Follows with Dr Dayan Caruso at [facility] [place] operated by [facility] and gets surveillance MRI brain s/p meningioma  Take Medrol dose pack for status migrainosus  Follow up in a month

## 2019-04-25 ENCOUNTER — OFFICE VISIT (OUTPATIENT)
Dept: NEUROLOGY | Facility: CLINIC | Age: 49
End: 2019-04-25
Payer: COMMERCIAL

## 2019-04-25 ENCOUNTER — TELEPHONE (OUTPATIENT)
Dept: NEUROLOGY | Facility: CLINIC | Age: 49
End: 2019-04-25

## 2019-04-25 VITALS
HEART RATE: 84 BPM | TEMPERATURE: 97 F | RESPIRATION RATE: 16 BRPM | BODY MASS INDEX: 41.02 KG/M2 | WEIGHT: 293 LBS | SYSTOLIC BLOOD PRESSURE: 134 MMHG | HEIGHT: 71 IN | DIASTOLIC BLOOD PRESSURE: 84 MMHG

## 2019-04-25 DIAGNOSIS — G43.709 CHRONIC MIGRAINE W/O AURA W/O STATUS MIGRAINOSUS, NOT INTRACTABLE: Primary | ICD-10-CM

## 2019-04-25 PROCEDURE — 99213 OFFICE O/P EST LOW 20 MIN: CPT | Performed by: OTHER

## 2019-04-25 NOTE — PROGRESS NOTES
HPI:    Patient ID: Benedicto Evans is a 57year old female. HPI     Patient presented for follow up migraines. Headache got worse couple months ago but past week had increased headaches due to weather changes.  She has been on Topamax and compliant with TABLET(500 MG) BY MOUTH EVERY DAY PRN) Disp: 30 tablet Rfl: 0   Ondansetron HCl (ZOFRAN) 4 mg tablet Take 1 tablet (4 mg total) by mouth every 8 (eight) hours as needed for Nausea.  Disp: 30 tablet Rfl: 1   topiramate 50 MG Oral Tab Take 1 tablet (50 mg tot present  Coordination: Intact  Gait: Normal.             ASSESSMENT/PLAN:   (G43.909) Chronic migraine w/o aura w/o status migrainosus, not intractable  (primary encounter diagnosis)    Responded well to [facility].  Continue current management    Follows wit

## 2019-05-14 NOTE — TELEPHONE ENCOUNTER
[facility] paperwork signed and mailed to patient as requested. Copy sent to scanning.

## 2019-05-15 DIAGNOSIS — N83.209 CYST OF OVARY, UNSPECIFIED LATERALITY: Primary | ICD-10-CM

## 2019-05-15 DIAGNOSIS — Z00.00 LABORATORY EXAMINATION ORDERED AS PART OF A ROUTINE GENERAL MEDICAL EXAMINATION: ICD-10-CM

## 2019-05-15 DIAGNOSIS — Z12.31 ENCOUNTER FOR SCREENING MAMMOGRAM FOR BREAST CANCER: ICD-10-CM

## 2019-05-15 RX ORDER — VALACYCLOVIR HYDROCHLORIDE 500 MG/1
TABLET, FILM COATED ORAL
Qty: 30 TABLET | Refills: 0 | Status: SHIPPED | OUTPATIENT
Start: 2019-05-15 | End: 2020-09-28

## 2019-05-15 NOTE — TELEPHONE ENCOUNTER
Lm for pt to return phone call.   Pt is due for WWE    Valacyclovir    Last OV relevant to medication: 5-8-18  Last refill date: 4-3-19  #/refills: 0  When pt was asked to return for OV: none  Upcoming appt/reason: none  Recent labs: 9-14-17: HSV and 2-Spec

## 2019-05-15 NOTE — TELEPHONE ENCOUNTER
Patient called back. Stated she would like Kaykay to place her lab orders, her mammogram, and the Ultra [facility], and only then will patient schedule her appointment. Please call pt with order status if approved.

## 2019-06-20 NOTE — TELEPHONE ENCOUNTER
Lm for pt to return phone call.   Pt due for CPX     Valacyclovir failed protocol due  Herpes Agent Protocol Failed6/20 3:54 AM   Appointment in the past 12 or next 3 months   Last OV relevant to medication: 5-8-18  Last refill date: 5-15-19  #/refills: 0  When pt was asked to return for OV: none  Upcoming appt/reason: none  Recent labs: none

## 2019-06-21 RX ORDER — VALACYCLOVIR HYDROCHLORIDE 500 MG/1
TABLET, FILM COATED ORAL
Qty: 30 TABLET | Refills: 0 | OUTPATIENT
Start: 2019-06-21

## 2019-09-13 DIAGNOSIS — E55.9 VITAMIN D DEFICIENCY: ICD-10-CM

## 2019-09-14 RX ORDER — ERGOCALCIFEROL 1.25 MG/1
CAPSULE ORAL
Qty: 12 CAPSULE | Refills: 0 | OUTPATIENT
Start: 2019-09-14

## 2019-09-14 NOTE — TELEPHONE ENCOUNTER
Sent my chart message, pt is due for WWE and lab work that placed back in May 2019    Ergocalciferol 50,000 units  Last OV relevant to medication: 10-7-17  Last refill date: 5-10-18  #/refills: 1  When pt was asked to return for OV: CPX 1 yr  Upcoming appt

## 2019-10-03 ENCOUNTER — OFFICE VISIT (OUTPATIENT)
Dept: NEUROLOGY | Facility: CLINIC | Age: 49
End: 2019-10-03
Payer: COMMERCIAL

## 2019-10-03 ENCOUNTER — LAB ENCOUNTER (OUTPATIENT)
Dept: LAB | Age: 49
End: 2019-10-03
Attending: FAMILY MEDICINE
Payer: COMMERCIAL

## 2019-10-03 VITALS
HEIGHT: 71 IN | SYSTOLIC BLOOD PRESSURE: 116 MMHG | HEART RATE: 68 BPM | RESPIRATION RATE: 16 BRPM | WEIGHT: 293 LBS | OXYGEN SATURATION: 100 % | TEMPERATURE: 98 F | DIASTOLIC BLOOD PRESSURE: 76 MMHG | BODY MASS INDEX: 41.02 KG/M2

## 2019-10-03 DIAGNOSIS — G43.709 CHRONIC MIGRAINE W/O AURA W/O STATUS MIGRAINOSUS, NOT INTRACTABLE: Primary | ICD-10-CM

## 2019-10-03 DIAGNOSIS — Z00.00 LABORATORY EXAMINATION ORDERED AS PART OF A ROUTINE GENERAL MEDICAL EXAMINATION: ICD-10-CM

## 2019-10-03 PROCEDURE — 99213 OFFICE O/P EST LOW 20 MIN: CPT | Performed by: OTHER

## 2019-10-03 PROCEDURE — 80053 COMPREHEN METABOLIC PANEL: CPT

## 2019-10-03 PROCEDURE — 85025 COMPLETE CBC W/AUTO DIFF WBC: CPT

## 2019-10-03 PROCEDURE — 80061 LIPID PANEL: CPT

## 2019-10-03 PROCEDURE — 36415 COLL VENOUS BLD VENIPUNCTURE: CPT

## 2019-10-03 PROCEDURE — 84443 ASSAY THYROID STIM HORMONE: CPT

## 2019-10-03 PROCEDURE — 82306 VITAMIN D 25 HYDROXY: CPT

## 2019-10-03 PROCEDURE — 83036 HEMOGLOBIN GLYCOSYLATED A1C: CPT

## 2019-10-03 RX ORDER — DIAZEPAM 5 MG/1
5 TABLET ORAL DAILY PRN
COMMUNITY
End: 2020-04-15

## 2019-10-03 NOTE — PROGRESS NOTES
HPI:    Patient ID: Melody Jesus is a 54year old female. HPI   Emir Smith presented for migraine follow up. States Emgality injections working well and see significant improvement in her migraines.  She noticed SOB each time with Emgality injections which MG Oral Tab Take 5 mg by mouth daily as needed. Disp:  Rfl:    Galcanezumab-gnlm (EMGALITY) 120 MG/ML Subcutaneous Solution Auto-injector Inject into the muscle every 28 days.  Disp:  Rfl:    VALACYCLOVIR  MG Oral Tab TAKE 1 TABLET(500 MG) BY MOUTH E brisk bilaterally. EOMs intact. Visual fields are full. Face is symmetrical. Tongue is midline. Uvula and palate elevate symmetrically. Shrug shoulders normally bilaterally. The rest of the cranial nerves are grossly intact.  Sensation to light touch is int

## 2019-10-09 ENCOUNTER — TELEPHONE (OUTPATIENT)
Dept: INTERNAL MEDICINE CLINIC | Facility: CLINIC | Age: 49
End: 2019-10-09

## 2019-10-09 DIAGNOSIS — E78.00 PURE HYPERCHOLESTEROLEMIA: ICD-10-CM

## 2019-10-09 DIAGNOSIS — E55.9 VITAMIN D DEFICIENCY: Primary | ICD-10-CM

## 2019-10-09 DIAGNOSIS — D64.9 ANEMIA, UNSPECIFIED TYPE: ICD-10-CM

## 2019-10-10 RX ORDER — ERGOCALCIFEROL 1.25 MG/1
50000 CAPSULE ORAL WEEKLY
Qty: 12 CAPSULE | Refills: 1 | Status: SHIPPED | OUTPATIENT
Start: 2019-10-10 | End: 2019-11-09

## 2019-10-10 NOTE — TELEPHONE ENCOUNTER
Notes recorded by LUIS Anderson on 10/10/2019 at 8:29 AM CDT  Ok to recheck lipids in 3 months    Verified pt views [employer]. Message sent via [address] [place] [address] [address] with provider response to recheck lipids in 3 months. Repeat lipid ordered.

## 2019-11-15 ENCOUNTER — TELEPHONE (OUTPATIENT)
Dept: NEUROLOGY | Facility: CLINIC | Age: 49
End: 2019-11-15

## 2019-11-15 ENCOUNTER — NURSE ONLY (OUTPATIENT)
Dept: NEUROLOGY | Facility: CLINIC | Age: 49
End: 2019-11-15
Payer: COMMERCIAL

## 2019-11-15 DIAGNOSIS — IMO0002 CHRONIC MIGRAINE: Primary | ICD-10-CM

## 2019-11-15 DIAGNOSIS — G43.709 CHRONIC MIGRAINE W/O AURA W/O STATUS MIGRAINOSUS, NOT INTRACTABLE: Primary | ICD-10-CM

## 2019-11-15 PROCEDURE — 96372 THER/PROPH/DIAG INJ SC/IM: CPT | Performed by: OTHER

## 2019-11-15 RX ORDER — METHYLPREDNISOLONE 4 MG/1
TABLET ORAL
Qty: 1 PACKAGE | Refills: 0 | Status: SHIPPED | OUTPATIENT
Start: 2019-11-15 | End: 2020-01-24

## 2019-11-15 RX ORDER — KETOROLAC TROMETHAMINE 30 MG/ML
30 INJECTION, SOLUTION INTRAMUSCULAR; INTRAVENOUS ONCE
Status: COMPLETED | OUTPATIENT
Start: 2019-11-15 | End: 2019-11-15

## 2019-11-15 RX ORDER — DEXAMETHASONE SODIUM PHOSPHATE 10 MG/ML
10 INJECTION, SOLUTION INTRAMUSCULAR; INTRAVENOUS ONCE
Status: COMPLETED | OUTPATIENT
Start: 2019-11-15 | End: 2019-11-15

## 2019-11-15 RX ADMIN — DEXAMETHASONE SODIUM PHOSPHATE 10 MG: 10 INJECTION, SOLUTION INTRAMUSCULAR; INTRAVENOUS at 13:00:00

## 2019-11-15 RX ADMIN — KETOROLAC TROMETHAMINE 30 MG: 30 INJECTION, SOLUTION INTRAMUSCULAR; INTRAVENOUS at 13:00:00

## 2019-11-15 NOTE — TELEPHONE ENCOUNTER
Per routing comment;    MD Yonas Chaudhry RN   Caller: Unspecified (Today,  9:04 AM)             She can get a decadron and toradol shot then medrol dose pack at home. Thanks      Called patient and informed her of above.  Patient stat

## 2019-11-15 NOTE — TELEPHONE ENCOUNTER
Patient calling IMTIAZ to report 3 day migraine. Acute Migraine assessment:    Is this your typical migraine? Describe any change in character from past migraines. Yes: Has lasted 3 days and has not been this bad for awhile.   Patient reporting that she d

## 2020-01-24 ENCOUNTER — TELEPHONE (OUTPATIENT)
Dept: NEUROLOGY | Facility: CLINIC | Age: 50
End: 2020-01-24

## 2020-01-24 DIAGNOSIS — IMO0002 CHRONIC MIGRAINE: ICD-10-CM

## 2020-01-24 RX ORDER — METHYLPREDNISOLONE 4 MG/1
TABLET ORAL
Qty: 1 PACKAGE | Refills: 0 | Status: SHIPPED | OUTPATIENT
Start: 2020-01-24 | End: 2020-07-20 | Stop reason: ALTCHOICE

## 2020-01-24 NOTE — TELEPHONE ENCOUNTER
Will do Medrol dose pack and increase Aimovig to 140 mg SC per Dr Gaurav Meadows verbally as well as through routing note.     Patient notified that Rx Medrol Dose Christopher as well as Rx Aimovig 140mg were sent to [facility].     PA initiated for new Aimovig dose of 140mg an

## 2020-01-29 ENCOUNTER — OFFICE VISIT (OUTPATIENT)
Dept: NEUROLOGY | Facility: CLINIC | Age: 50
End: 2020-01-29
Payer: COMMERCIAL

## 2020-01-29 ENCOUNTER — TELEPHONE (OUTPATIENT)
Dept: NEUROLOGY | Facility: CLINIC | Age: 50
End: 2020-01-29

## 2020-01-29 VITALS
HEART RATE: 76 BPM | RESPIRATION RATE: 18 BRPM | SYSTOLIC BLOOD PRESSURE: 128 MMHG | DIASTOLIC BLOOD PRESSURE: 72 MMHG | BODY MASS INDEX: 44 KG/M2 | WEIGHT: 293 LBS

## 2020-01-29 DIAGNOSIS — G43.709 CHRONIC MIGRAINE W/O AURA W/O STATUS MIGRAINOSUS, NOT INTRACTABLE: Primary | ICD-10-CM

## 2020-01-29 DIAGNOSIS — G43.009 MIGRAINE WITHOUT AURA AND WITHOUT STATUS MIGRAINOSUS, NOT INTRACTABLE: ICD-10-CM

## 2020-01-29 DIAGNOSIS — Z98.890 S/P RESECTION OF MENINGIOMA: ICD-10-CM

## 2020-01-29 DIAGNOSIS — Z86.018 S/P RESECTION OF MENINGIOMA: ICD-10-CM

## 2020-01-29 PROCEDURE — 99213 OFFICE O/P EST LOW 20 MIN: CPT | Performed by: OTHER

## 2020-01-29 RX ORDER — TOPIRAMATE 50 MG/1
TABLET, FILM COATED ORAL
Qty: 90 TABLET | Refills: 2 | Status: SHIPPED | OUTPATIENT
Start: 2020-01-29 | End: 2020-05-01

## 2020-01-29 NOTE — TELEPHONE ENCOUNTER
Pt in office today for office visit. Pt was given a work letter during office visit, she states she needs this letter to be more specific. New letter pended and sent to provider for signature.     Pt would like letter sent via [address]

## 2020-01-29 NOTE — PROGRESS NOTES
The patient states an increase in migraines and increase of the frequency of her migraines. Increase in confusion and difficulty focusing.

## 2020-01-29 NOTE — PROGRESS NOTES
HPI:    Patient ID: Dante Peck is a 54year old female. HPI     Armily Clark presented for migraine follow up. She states her migraine have increased since the winter came in and continues to have frequent migraine headache.  There is no change in the patte reviewed and are negative.            Current Outpatient Medications   Medication Sig Dispense Refill   • topiramate 50 MG Oral Tab Take 1 tab AM and 2 tab PM 90 tablet 2   • Erenumab-aooe (AIMOVIG) 140 MG/ML SC SOAJ Inject 1 mL (140 mg total) into the skin intact comprehension. Pupils equally round and reactive to light. 3+ brisk bilaterally. EOMs intact. Visual fields are full. Face is symmetrical. Tongue is midline. Uvula and palate elevate symmetrically. Shrug shoulders normally bilaterally.  The rest of t

## 2020-01-29 NOTE — TELEPHONE ENCOUNTER
An active PA is already on file with expiration date of 10/03/2020    Spoke with Rubin Cruz at [employer] [phone]    Per Rubin Cruz there is Approval on File for Aimovig which is covers there 140 mg/ml as well.

## 2020-02-05 NOTE — TELEPHONE ENCOUNTER
Spoke with pt, informed her that paperwork is complete and ready for . Pt states she will come to office to  paperwork. Paperwork placed in envelope and left at  for pt.     Copy of paperwork sent to scanning with scanning cover

## 2020-02-12 ENCOUNTER — TELEPHONE (OUTPATIENT)
Dept: INTERNAL MEDICINE CLINIC | Facility: CLINIC | Age: 50
End: 2020-02-12

## 2020-02-12 NOTE — TELEPHONE ENCOUNTER
Patient picked up paperwork FMLA at [place] office for Dr Maliha Lancaster today and informed that doctor needs to add/complete one more item for patient. Patient dropped  Ewing for completion tomorrow 2/13/20 when Dr Maliha Lancaster is in office.  Arturo

## 2020-02-12 NOTE — TELEPHONE ENCOUNTER
Patient came back into [facility] after speaking to doctor; note is complete; gave back fmla paperwork to patient

## 2020-03-07 ENCOUNTER — HOSPITAL ENCOUNTER (EMERGENCY)
Age: 50
Discharge: HOME | End: 2020-03-07
Attending: EMERGENCY MEDICINE
Payer: COMMERCIAL

## 2020-03-07 VITALS
OXYGEN SATURATION: 100 % | RESPIRATION RATE: 20 BRPM | TEMPERATURE: 98 F | SYSTOLIC BLOOD PRESSURE: 140 MMHG | WEIGHT: 293 LBS | HEIGHT: 71 IN | HEART RATE: 87 BPM | DIASTOLIC BLOOD PRESSURE: 91 MMHG | BODY MASS INDEX: 41.02 KG/M2

## 2020-03-07 DIAGNOSIS — T81.89XA DRAINING POSTOPERATIVE WOUND, INITIAL ENCOUNTER: ICD-10-CM

## 2020-03-07 DIAGNOSIS — L03.316 CELLULITIS, UMBILICAL: Primary | ICD-10-CM

## 2020-03-07 PROCEDURE — 99283 EMERGENCY DEPT VISIT LOW MDM: CPT | Performed by: EMERGENCY MEDICINE

## 2020-03-07 PROCEDURE — 87205 SMEAR GRAM STAIN: CPT | Performed by: EMERGENCY MEDICINE

## 2020-03-07 PROCEDURE — 87070 CULTURE OTHR SPECIMN AEROBIC: CPT | Performed by: EMERGENCY MEDICINE

## 2020-03-07 RX ORDER — CEFADROXIL 500 MG/1
500 CAPSULE ORAL 2 TIMES DAILY
Qty: 20 CAPSULE | Refills: 0 | Status: SHIPPED | OUTPATIENT
Start: 2020-03-07 | End: 2020-03-17

## 2020-03-07 RX ORDER — CEPHALEXIN 250 MG/1
250 CAPSULE ORAL ONCE
Status: COMPLETED | OUTPATIENT
Start: 2020-03-07 | End: 2020-03-07

## 2020-03-07 NOTE — ED PROVIDER NOTES
Patient Seen in: THE [facility] Emergency Department In [place]      History   Patient presents with:  Abdominal Pain    Stated Complaint: abd pain started x 3 days, wound draining    HPI    82-year-old female presents to the emergency department complaining SpO2 100%   BMI 42.54 kg/m²         Physical Exam    General appearance: This is a middle-aged female sitting on a gurney. Vital signs were reviewed per nurse's notes. Abdomen: NABS. Obese but soft.   There is a several centimeter area of warmth and eryt

## 2020-03-07 NOTE — ED INITIAL ASSESSMENT (HPI)
States abdominal surgery 4 years ago - now complaining of wound opening and abdominal pain in that area.

## 2020-03-12 ENCOUNTER — TELEPHONE (OUTPATIENT)
Dept: INTERNAL MEDICINE CLINIC | Facility: CLINIC | Age: 50
End: 2020-03-12

## 2020-03-12 ENCOUNTER — OFFICE VISIT (OUTPATIENT)
Dept: WOUND CARE | Facility: HOSPITAL | Age: 50
End: 2020-03-12
Attending: NURSE PRACTITIONER
Payer: COMMERCIAL

## 2020-03-12 DIAGNOSIS — T83.718A: ICD-10-CM

## 2020-03-12 DIAGNOSIS — L08.9 INFECTED WOUND: Primary | ICD-10-CM

## 2020-03-12 DIAGNOSIS — T14.8XXA INFECTED WOUND: Primary | ICD-10-CM

## 2020-03-12 DIAGNOSIS — T81.31XS DISRUPTION OF EXTERNAL OPERATION (SURGICAL) WOUND, NOT ELSEWHERE CLASSIFIED, SEQUELA: ICD-10-CM

## 2020-03-12 PROCEDURE — 87205 SMEAR GRAM STAIN: CPT

## 2020-03-12 PROCEDURE — 87070 CULTURE OTHR SPECIMN AEROBIC: CPT

## 2020-03-12 PROCEDURE — 99214 OFFICE O/P EST MOD 30 MIN: CPT

## 2020-03-12 NOTE — TELEPHONE ENCOUNTER
I have not seen Pt since 5/2018. I am not giving her pain medication without seeing her. Pt needs an appt with one of us. Pt saw NP in wound clinic today, she should of gave her pain medication since she is treating her.

## 2020-03-12 NOTE — TELEPHONE ENCOUNTER
Patient calling in, stated she would like to speak with the Nurse regarding her ER visit. Pt did not specify nor disclose reasoning.

## 2020-03-12 NOTE — PROGRESS NOTES
Subjective    Chief Complaint  This information was obtained from the patient  Patient is here for an initial consult. She presents with a surgical abdominal wound from surgery in 2014. The wound has closed multiple times, but continues to re-open.     Jaya Kessler This information was obtained from the patient, chart  Patient has a medical history of:  Ectopic pregnancy  Closed fractures of lower limb (child)  Migraines  Anemia  Brain mass (2009)  Meningioma (2008)  Obesity  Anxiety  Herpes simplex virus infection Objective    Wound Assessment(s)  Wound #1 Abdomen is a chronic Full Thickness Surgical Wound and has received a status of Not Healed.  Initial wound encounter measurements are 0.5cm length x 0.4cm width x 2cm depth, with an area of 0.2 sq cm and a volume o Judgment and insight intact. Alert and oriented times 3. No evidence of depression, anxiety, or agitation. Calm, cooperative, and communicative. Appropriate interactions and affect. Assessment    Active Problems    ICD-10  (Encounter Diagnosis) T81. CAT Scan - abdomen and pelvis w and without contrast assess abdominal mesh under umbilicus    Follow-Up Appointments:  A follow-up appointment should be scheduled.         discussed with patient wound healing aspects, she is aware as she has packed her woun

## 2020-03-17 ENCOUNTER — HOSPITAL ENCOUNTER (OUTPATIENT)
Dept: MAMMOGRAPHY | Age: 50
Discharge: HOME | End: 2020-03-17
Attending: FAMILY MEDICINE
Payer: COMMERCIAL

## 2020-03-17 ENCOUNTER — HOSPITAL ENCOUNTER (OUTPATIENT)
Age: 50
Discharge: HOME | End: 2020-03-17
Payer: COMMERCIAL

## 2020-03-17 VITALS
HEART RATE: 82 BPM | HEIGHT: 71 IN | WEIGHT: 293 LBS | DIASTOLIC BLOOD PRESSURE: 90 MMHG | RESPIRATION RATE: 14 BRPM | BODY MASS INDEX: 41.02 KG/M2 | SYSTOLIC BLOOD PRESSURE: 148 MMHG | OXYGEN SATURATION: 99 % | TEMPERATURE: 97 F

## 2020-03-17 DIAGNOSIS — Z12.31 ENCOUNTER FOR SCREENING MAMMOGRAM FOR BREAST CANCER: ICD-10-CM

## 2020-03-17 DIAGNOSIS — T81.30XA ABDOMINAL WOUND DEHISCENCE, INITIAL ENCOUNTER: Primary | ICD-10-CM

## 2020-03-17 PROCEDURE — 99214 OFFICE O/P EST MOD 30 MIN: CPT

## 2020-03-17 PROCEDURE — 96372 THER/PROPH/DIAG INJ SC/IM: CPT

## 2020-03-17 RX ORDER — KETOROLAC TROMETHAMINE 30 MG/ML
60 INJECTION, SOLUTION INTRAMUSCULAR; INTRAVENOUS ONCE
Status: COMPLETED | OUTPATIENT
Start: 2020-03-17 | End: 2020-03-17

## 2020-03-17 NOTE — ED INITIAL ASSESSMENT (HPI)
C/o abdominal pain that started a few weeks ago. Sts that she had abdominal hyster 4-5 years ago and the incision opens as a result. Currently seeing wound clinic for the current open wound.

## 2020-03-17 NOTE — ED PROVIDER NOTES
Patient Seen in: [facility]      History   Patient presents with:  Abdominal Pain    Stated Complaint: abd pain/wound check    HPI  Patient is 29-year-old female past medical history of migraine headaches/status post hysterectom 2003                Family history reviewed with patient/caregiver and is not pertinent to presenting problem.     Social History    Tobacco Use      Smoking status: Never Smoker      Smokeless tobacco: Never Used    Alcohol use: Yes      Comment: occ    Dr tinted packing in place. There is no obvious drainage noted. No surrounding cellulitis or erythema or swelling. Abdomen is soft with active bowel sounds throughout. Skin:     General: Skin is warm and dry.       Capillary Refill: Capillary refill takes

## 2020-03-20 DIAGNOSIS — G43.709 CHRONIC MIGRAINE W/O AURA W/O STATUS MIGRAINOSUS, NOT INTRACTABLE: Primary | ICD-10-CM

## 2020-03-20 DIAGNOSIS — G43.009 MIGRAINE WITHOUT AURA AND WITHOUT STATUS MIGRAINOSUS, NOT INTRACTABLE: ICD-10-CM

## 2020-03-20 DIAGNOSIS — IMO0002 CHRONIC MIGRAINE: ICD-10-CM

## 2020-03-20 NOTE — TELEPHONE ENCOUNTER
Medication:  Aimovig     Date of last refill: 1/24/20 (#1/2)  [place]     Last office visit: 1/29/2020  Due back to clinic per last office note:  3 months  Date next office visit scheduled:    Future Appointments   Date

## 2020-03-24 ENCOUNTER — HOSPITAL ENCOUNTER (OUTPATIENT)
Dept: ULTRASOUND IMAGING | Age: 50
Discharge: HOME | End: 2020-03-24
Attending: FAMILY MEDICINE
Payer: COMMERCIAL

## 2020-03-24 DIAGNOSIS — N83.209 CYST OF OVARY, UNSPECIFIED LATERALITY: ICD-10-CM

## 2020-03-24 PROCEDURE — 76830 TRANSVAGINAL US NON-OB: CPT | Performed by: FAMILY MEDICINE

## 2020-03-24 PROCEDURE — 76856 US EXAM PELVIC COMPLETE: CPT | Performed by: FAMILY MEDICINE

## 2020-03-25 ENCOUNTER — HOSPITAL ENCOUNTER (OUTPATIENT)
Dept: CT IMAGING | Age: 50
Discharge: HOME | End: 2020-03-25
Attending: NURSE PRACTITIONER
Payer: COMMERCIAL

## 2020-03-25 DIAGNOSIS — T81.31XA DISRUPTION OF EXTERNAL SURGICAL WOUND: ICD-10-CM

## 2020-03-25 LAB — CREAT BLD-MCNC: 0.8 MG/DL (ref 0.55–1.02)

## 2020-03-25 PROCEDURE — 74178 CT ABD&PLV WO CNTR FLWD CNTR: CPT | Performed by: NURSE PRACTITIONER

## 2020-03-25 PROCEDURE — 82565 ASSAY OF CREATININE: CPT

## 2020-03-26 ENCOUNTER — OFFICE VISIT (OUTPATIENT)
Dept: WOUND CARE | Facility: HOSPITAL | Age: 50
End: 2020-03-26
Attending: NURSE PRACTITIONER
Payer: COMMERCIAL

## 2020-03-26 DIAGNOSIS — T81.31XA DISRUPTION OF EXTERNAL SURGICAL WOUND: Primary | ICD-10-CM

## 2020-03-26 DIAGNOSIS — T81.31XS DISRUPTION OF EXTERNAL OPERATION (SURGICAL) WOUND, NOT ELSEWHERE CLASSIFIED, SEQUELA: ICD-10-CM

## 2020-03-26 PROCEDURE — 99213 OFFICE O/P EST LOW 20 MIN: CPT

## 2020-03-26 NOTE — PROGRESS NOTES
Subjective    Chief Complaint  This information was obtained from the patient  Patient is here for a follow. CT scan and ultrasound completed. Denies pain, \"it just feels irradiated. \"    Allergies  Emgality (Severity: Severe, Reaction: Shortness of breat chart    Complaints and Symptoms  Patient complains of:  Eyes: Glasses / Contacts (reading)  Integumentary (Hair/Skin/Nails):  Other (recurrent umbilical abcess)  Neurological: Headaches (migraine hx), Other (menigioma hx)    Patient denies complaints or sy wnl for patient. Pulse Regular and wnl for patient. [place] Respirations easy and unlabored. Temperature wnl. elevated BMI. Appearance neat and clean. Appears in no acute distress. Well nourished and well developed. Musculoskeletal:  Gait and station stable. fortified dairy products, liver, fortified cereals  Zinc: Fortified cereals, red meats, seafood    S/S of Infection    Additional Orders:    Care summary  Reviewed and evaluated labs.  - Oct 2019 bun 9, creat 0.71, gfr 102  Wound type: - surgical wound recu

## 2020-04-08 ENCOUNTER — OFFICE VISIT (OUTPATIENT)
Dept: WOUND CARE | Facility: HOSPITAL | Age: 50
End: 2020-04-08
Attending: NURSE PRACTITIONER
Payer: COMMERCIAL

## 2020-04-08 DIAGNOSIS — T81.31XS DISRUPTION OF EXTERNAL OPERATION (SURGICAL) WOUND, NOT ELSEWHERE CLASSIFIED, SEQUELA: ICD-10-CM

## 2020-04-08 DIAGNOSIS — T81.31XA DISRUPTION OF EXTERNAL SURGICAL WOUND: Primary | ICD-10-CM

## 2020-04-08 PROCEDURE — 99213 OFFICE O/P EST LOW 20 MIN: CPT

## 2020-04-08 NOTE — PROGRESS NOTES
Subjective    Chief Complaint  This information was obtained from the patient  Patient is here for a follow. Pt states no new complaints or pain.     Allergies  Emgality (Severity: Severe, Reaction: Shortness of breath)    HPI  This information was obtained is resolved. we discussed to continue to paint the area with betading to help keep it dessicated and bacterial burden down. she has the name of the general surgeon to f/u with should the area recur. patient dc'd from clinic.     Review of Systems (ROS)  Th symptoms of high BP, but she does have a migrane which is normal for her she states when the weather is changing. Physical Exam  Constitutional  bp wnl for patient. Pulse Regular and wnl for patient. [place] Respirations easy and unlabored. Temperature wnl.  Ob cereals  Zinc: Fortified cereals, red meats, seafood    S/S of Infection    Additional Orders:    Care summary  Reviewed and evaluated labs. - Oct 2019 bun 9, creat 0.71, gfr 102  Wound type: - surgical wound recurrence  Wound improving.  No s/s of infectio

## 2020-04-09 ENCOUNTER — APPOINTMENT (OUTPATIENT)
Dept: WOUND CARE | Facility: HOSPITAL | Age: 50
End: 2020-04-09
Attending: NURSE PRACTITIONER
Payer: COMMERCIAL

## 2020-04-09 ENCOUNTER — TELEPHONE (OUTPATIENT)
Dept: INTERNAL MEDICINE CLINIC | Facility: CLINIC | Age: 50
End: 2020-04-09

## 2020-04-09 NOTE — TELEPHONE ENCOUNTER
Last ov was on May 2018. Pt stated sees wound clinic and was recommended to f/u w pcp due to elevated reading.      Per epic bp readings as follows:  3/17/20 at 148/90  3/07/20 at 140/91    Pt stated a few years back took triamterene, pt wondering if ca

## 2020-04-09 NOTE — TELEPHONE ENCOUNTER
That is fine, but if her condition worsens let her know there is someone in the office every day if she needs an appt sooner.

## 2020-04-09 NOTE — TELEPHONE ENCOUNTER
Requesting xavi for BP   - Insurance information confirmed/updated  - Patient informed that they may receive a call from a blocked/unavailable number.  - Patient aware that a charge may apply if call is converted to a telephone visit with the provider.

## 2020-04-14 ENCOUNTER — OFFICE VISIT (OUTPATIENT)
Dept: WOUND CARE | Facility: HOSPITAL | Age: 50
End: 2020-04-14
Attending: NURSE PRACTITIONER
Payer: COMMERCIAL

## 2020-04-14 DIAGNOSIS — T81.31XA DISRUPTION OF EXTERNAL SURGICAL WOUND: Primary | ICD-10-CM

## 2020-04-14 DIAGNOSIS — T81.31XS DISRUPTION OF EXTERNAL OPERATION (SURGICAL) WOUND, NOT ELSEWHERE CLASSIFIED, SEQUELA: ICD-10-CM

## 2020-04-14 PROCEDURE — 99213 OFFICE O/P EST LOW 20 MIN: CPT

## 2020-04-14 NOTE — PROGRESS NOTES
Subjective    Chief Complaint  This information was obtained from the patient  Patient is here for a follow. Pt states wound reopened Friday.  She states the only thing that has changed was she started cleaning up the house and noticed pain in her abdomen s betadine into the naval and cover with bandaid twice daily    4-8-20 patient returns today, she has been utilizing the betadine for about 5 days, she has not noted any draiange, the wound is resolved.  we discussed to continue to paint the area with betadin cm and a volume of 0.285 cubic cm. No tunneling has been noted. No sinus tract has been noted. No undermining has been noted. There is a small amount of sero-sanguineous drainage noted which has no odor. The patient reports a wound pain of level 7/10.  The multivitamin. Increase dietary protein - look for Jaylon by [employer] (These are essential branch chain amino acids that help with tissue building and wound healing) and take 2 packets/day.  you can order online at [employer] or [employer] (DISCOUNT CODE for $15

## 2020-04-15 ENCOUNTER — VIRTUAL PHONE E/M (OUTPATIENT)
Dept: INTERNAL MEDICINE CLINIC | Facility: CLINIC | Age: 50
End: 2020-04-15
Payer: COMMERCIAL

## 2020-04-15 DIAGNOSIS — F41.9 ANXIETY: ICD-10-CM

## 2020-04-15 DIAGNOSIS — I10 ESSENTIAL HYPERTENSION: Primary | ICD-10-CM

## 2020-04-15 PROCEDURE — 99213 OFFICE O/P EST LOW 20 MIN: CPT | Performed by: FAMILY MEDICINE

## 2020-04-15 RX ORDER — TRIAMTERENE AND HYDROCHLOROTHIAZIDE 37.5; 25 MG/1; MG/1
1 CAPSULE ORAL EVERY MORNING
Qty: 30 CAPSULE | Refills: 1 | Status: SHIPPED | OUTPATIENT
Start: 2020-04-15 | End: 2020-07-13

## 2020-04-15 RX ORDER — DIAZEPAM 5 MG/1
5 TABLET ORAL NIGHTLY PRN
Qty: 30 TABLET | Refills: 0 | Status: SHIPPED | OUTPATIENT
Start: 2020-04-15 | End: 2020-09-28

## 2020-04-15 NOTE — PROGRESS NOTES
Virtual Telephone Check-In    Marva Sal verbally consents to a Virtual/Telephone Check-In visit on 04/15/20. Patient understands and accepts financial responsibility for any deductible, co-insurance and/or co-pays associated with this service.     D is A&Ox3, speaking in complete sentences with no audible respiratory distress or cough.   Appropriate insight and judgement    Plan:   Essential hypertension  (primary encounter diagnosis)  Anxiety    No orders of the defined types were placed in this encou

## 2020-04-28 ENCOUNTER — OFFICE VISIT (OUTPATIENT)
Dept: WOUND CARE | Facility: HOSPITAL | Age: 50
End: 2020-04-28
Attending: NURSE PRACTITIONER
Payer: COMMERCIAL

## 2020-04-28 DIAGNOSIS — T81.31XA DISRUPTION OF EXTERNAL SURGICAL WOUND: Primary | ICD-10-CM

## 2020-04-28 PROCEDURE — 99213 OFFICE O/P EST LOW 20 MIN: CPT

## 2020-04-28 NOTE — PROGRESS NOTES
Subjective    Chief Complaint  This information was obtained from the patient  Patient is here for a follow up visit for an abdominal wound.     Allergies  Emgality (Severity: Severe, Reaction: Shortness of breath)    HPI  This information was obtained from resolved. we discussed to continue to paint the area with betading to help keep it dessicated and bacterial burden down. she has the name of the general surgeon to f/u with should the area recur.  patient dc'd from clinic.    4-14-20 patient returns today Assessment(s)  Wound #1 Abdomen is a chronic Full Thickness Surgical Wound and has received a status of Not Healed.  Subsequent wound encounter measurements are 0.2cm length x 0.2cm width x 0.1cm depth, with an area of 0.04 sq cm and a volume of 0.004 cubic betadine twice daily  Change Dressing BID    Follow-Up Appointments:  Return Appointment in 2 weeks. Misc/Additional Orders:  Start or continue taking Jaylon  S/S of Infection    Additional Orders:    Care summary  Reviewed and evaluated labs.  - Oct 2019

## 2020-05-01 DIAGNOSIS — Z86.018 S/P RESECTION OF MENINGIOMA: ICD-10-CM

## 2020-05-01 DIAGNOSIS — Z98.890 S/P RESECTION OF MENINGIOMA: ICD-10-CM

## 2020-05-01 DIAGNOSIS — G43.009 MIGRAINE WITHOUT AURA AND WITHOUT STATUS MIGRAINOSUS, NOT INTRACTABLE: ICD-10-CM

## 2020-05-01 RX ORDER — TOPIRAMATE 50 MG/1
TABLET, FILM COATED ORAL
Qty: 270 TABLET | Refills: 0 | Status: SHIPPED | OUTPATIENT
Start: 2020-05-01 | End: 2020-09-08

## 2020-05-01 NOTE — TELEPHONE ENCOUNTER
Medication: TOPIRAMATE 50 MG Oral Tab    Date of last refill: 1/29/20 (#90/0)  [place]    Last office visit: 1/29/2020  Due back to clinic per last office note:  3 months  Date next office visit scheduled:    Future

## 2020-05-07 DIAGNOSIS — I10 ESSENTIAL HYPERTENSION: ICD-10-CM

## 2020-05-07 RX ORDER — TRIAMTERENE AND HYDROCHLOROTHIAZIDE 37.5; 25 MG/1; MG/1
CAPSULE ORAL
Qty: 30 CAPSULE | Refills: 1 | OUTPATIENT
Start: 2020-05-07

## 2020-05-07 NOTE — TELEPHONE ENCOUNTER
Pt states she didn't ask for no medication. She said disregard!     Triamterene-HCTZ 37.5- 25 mg failed protocol due to  Hypertension Medications Protocol Failed5/7 7:31 AM   Appointment in past 6 or next 3 months   Last OV relevant to medication: 4-15-20

## 2020-05-12 ENCOUNTER — OFFICE VISIT (OUTPATIENT)
Dept: WOUND CARE | Facility: HOSPITAL | Age: 50
End: 2020-05-12
Attending: NURSE PRACTITIONER
Payer: COMMERCIAL

## 2020-05-12 DIAGNOSIS — T81.31XA DISRUPTION OF EXTERNAL SURGICAL WOUND: Primary | ICD-10-CM

## 2020-05-12 PROCEDURE — 99213 OFFICE O/P EST LOW 20 MIN: CPT

## 2020-05-12 NOTE — PROGRESS NOTES
Subjective    Chief Complaint  This information was obtained from the patient  Patient is here for a follow up visit for an abdominal wound.     Allergies  Emgality (Severity: Severe, Reaction: Shortness of breath)    HPI  This information was obtained from resolved. we discussed to continue to paint the area with betading to help keep it dessicated and bacterial burden down. she has the name of the general surgeon to f/u with should the area recur.  patient dc'd from clinic.    4-14-20 patient returns today (Central/Peripheral): Chest Pain, Dyspnea on Exertion, Edema  Psychiatric: Claustrophobia, Nervousness / Tension, Memory Loss, Depression, Mental Illness    Additional Information  Medication reconciliation completed at today's visit. : Yes        Etienne Hernandez or tissue, initial encounter        Plan    Additional Orders: Follow-Up Appointments:  Discharge from Outpatient Services. Other: - wear abdominal binder whenever you are active        stressed the importance of supporting the pannus during activity.

## 2020-05-22 ENCOUNTER — VIRTUAL PHONE E/M (OUTPATIENT)
Dept: NEUROLOGY | Facility: CLINIC | Age: 50
End: 2020-05-22
Payer: COMMERCIAL

## 2020-05-22 DIAGNOSIS — G43.709 CHRONIC MIGRAINE W/O AURA W/O STATUS MIGRAINOSUS, NOT INTRACTABLE: Primary | ICD-10-CM

## 2020-05-22 PROCEDURE — 99213 OFFICE O/P EST LOW 20 MIN: CPT | Performed by: OTHER

## 2020-05-22 NOTE — PROGRESS NOTES
HPI:    Patient ID: Suresh Jett is a 54year old female.   Patient did not showed on line for video visit and was at work and visit converted to phone      Virtual/Telephone Check-In    Suresh Jett verbally consents a Virtual/Telephone Check-In servic Yes      Comment: occ    Drug use: No         Review of Systems   Constitutional: Negative. HENT: Negative. Eyes: Negative. Respiratory: Negative. Cardiovascular: Negative. Gastrointestinal: Negative. Neurological: Negative for headaches. approximately 15 minutes- >50% spent in care co-ordination and counseling  See orders and medications filed with this encounter. The patient indicates understanding of these issues and agrees with the plan.       No orders of the defined types were placed i

## 2020-06-21 ENCOUNTER — HOSPITAL ENCOUNTER (OUTPATIENT)
Age: 50
Discharge: HOME | End: 2020-06-21
Attending: FAMILY MEDICINE
Payer: COMMERCIAL

## 2020-06-21 VITALS
SYSTOLIC BLOOD PRESSURE: 115 MMHG | OXYGEN SATURATION: 98 % | RESPIRATION RATE: 20 BRPM | TEMPERATURE: 98 F | HEART RATE: 85 BPM | DIASTOLIC BLOOD PRESSURE: 61 MMHG

## 2020-06-21 DIAGNOSIS — M54.50 ACUTE LEFT-SIDED LOW BACK PAIN WITHOUT SCIATICA: Primary | ICD-10-CM

## 2020-06-21 PROCEDURE — 99214 OFFICE O/P EST MOD 30 MIN: CPT

## 2020-06-21 PROCEDURE — 96372 THER/PROPH/DIAG INJ SC/IM: CPT

## 2020-06-21 PROCEDURE — 81002 URINALYSIS NONAUTO W/O SCOPE: CPT | Performed by: FAMILY MEDICINE

## 2020-06-21 RX ORDER — IBUPROFEN 600 MG/1
600 TABLET ORAL EVERY 8 HOURS PRN
Qty: 21 TABLET | Refills: 0 | Status: SHIPPED | OUTPATIENT
Start: 2020-06-21 | End: 2020-06-28

## 2020-06-21 RX ORDER — CYCLOBENZAPRINE HCL 10 MG
10 TABLET ORAL NIGHTLY
Qty: 7 TABLET | Refills: 0 | Status: SHIPPED | OUTPATIENT
Start: 2020-06-21 | End: 2020-06-28

## 2020-06-21 RX ORDER — KETOROLAC TROMETHAMINE 30 MG/ML
30 INJECTION, SOLUTION INTRAMUSCULAR; INTRAVENOUS ONCE
Status: COMPLETED | OUTPATIENT
Start: 2020-06-21 | End: 2020-06-21

## 2020-06-21 NOTE — ED PROVIDER NOTES
Patient Seen in: THE [facility]      History   Patient presents with:  Back Pain    Stated Complaint: LOWER BACK PAIN    HPI  82-year-old female presents with lower back pain mostly to the left lower lumbar region for 2 days, no his Current:/61   Pulse 85   Temp 97.5 °F (36.4 °C) (Oral)   Resp 20   LMP 10/02/2013   SpO2 98%         Physical Exam  Constitutional:       Appearance: Normal appearance. She is well-developed. She is obese.    HENT:      Head: Normocephalic and a left lower lumbar pain with no history of radiation no urinary or bowel symptoms, urine dipstick showing trace of blood with no nitrites or leukocytes, patient has no urinary symptoms, CT scan from 3 months ago showing no renal calculi, clinical features a

## 2020-06-21 NOTE — ED INITIAL ASSESSMENT (HPI)
Pt has had lower back pain to the left side of her back for 2 days. Pt states she has not really had any urinary symptoms.

## 2020-07-13 DIAGNOSIS — I10 ESSENTIAL HYPERTENSION: ICD-10-CM

## 2020-07-13 RX ORDER — TRIAMTERENE AND HYDROCHLOROTHIAZIDE 37.5; 25 MG/1; MG/1
CAPSULE ORAL
Qty: 30 CAPSULE | Refills: 0 | Status: SHIPPED | OUTPATIENT
Start: 2020-07-13 | End: 2020-08-25 | Stop reason: ALTCHOICE

## 2020-07-13 NOTE — TELEPHONE ENCOUNTER
Triamterene-hctz 37.5mg-25 mg failed protocol due to  Hypertension Medications Protocol Failed7/13 10:32 AM   Appointment in past 6 or next 3 months   Last OV relevant to medication: 4-15-20  Last refill date: 4-15-20 #/refills: 1  When pt was asked to ret

## 2020-07-15 ENCOUNTER — ORDER TRANSCRIPTION (OUTPATIENT)
Dept: ADMINISTRATIVE | Facility: HOSPITAL | Age: 50
End: 2020-07-15

## 2020-07-15 ENCOUNTER — APPOINTMENT (OUTPATIENT)
Dept: LAB | Age: 50
End: 2020-07-15
Attending: FAMILY MEDICINE
Payer: COMMERCIAL

## 2020-07-15 ENCOUNTER — HOSPITAL ENCOUNTER (OUTPATIENT)
Dept: MAMMOGRAPHY | Age: 50
Discharge: HOME | End: 2020-07-15
Attending: FAMILY MEDICINE
Payer: COMMERCIAL

## 2020-07-15 DIAGNOSIS — Z12.31 ENCOUNTER FOR SCREENING MAMMOGRAM FOR MALIGNANT NEOPLASM OF BREAST: ICD-10-CM

## 2020-07-15 DIAGNOSIS — Z12.31 ENCOUNTER FOR SCREENING MAMMOGRAM FOR MALIGNANT NEOPLASM OF BREAST: Primary | ICD-10-CM

## 2020-07-15 LAB
BASOPHILS # BLD AUTO: 0.02 X10(3) UL (ref 0–0.2)
BASOPHILS NFR BLD AUTO: 0.5 %
CHOLEST SMN-MCNC: 254 MG/DL (ref ?–200)
DEPRECATED RDW RBC AUTO: 40.6 FL (ref 35.1–46.3)
EOSINOPHIL # BLD AUTO: 0.09 X10(3) UL (ref 0–0.7)
EOSINOPHIL NFR BLD AUTO: 2.2 %
ERYTHROCYTE [DISTWIDTH] IN BLOOD BY AUTOMATED COUNT: 12.4 % (ref 11–15)
HCT VFR BLD AUTO: 38 % (ref 35–48)
HDLC SERPL-MCNC: 45 MG/DL (ref 40–59)
HGB BLD-MCNC: 11.9 G/DL (ref 12–16)
IMM GRANULOCYTES # BLD AUTO: 0.01 X10(3) UL (ref 0–1)
IMM GRANULOCYTES NFR BLD: 0.2 %
LDLC SERPL CALC-MCNC: 189 MG/DL (ref ?–100)
LYMPHOCYTES # BLD AUTO: 1.88 X10(3) UL (ref 1–4)
LYMPHOCYTES NFR BLD AUTO: 45.2 %
MCH RBC QN AUTO: 28.3 PG (ref 26–34)
MCHC RBC AUTO-ENTMCNC: 31.3 G/DL (ref 31–37)
MCV RBC AUTO: 90.3 FL (ref 80–100)
MONOCYTES # BLD AUTO: 0.27 X10(3) UL (ref 0.1–1)
MONOCYTES NFR BLD AUTO: 6.5 %
NEUTROPHILS # BLD AUTO: 1.89 X10 (3) UL (ref 1.5–7.7)
NEUTROPHILS # BLD AUTO: 1.89 X10(3) UL (ref 1.5–7.7)
NEUTROPHILS NFR BLD AUTO: 45.4 %
NONHDLC SERPL-MCNC: 209 MG/DL (ref ?–130)
PATIENT FASTING Y/N/NP: YES
PLATELET # BLD AUTO: 303 10(3)UL (ref 150–450)
RBC # BLD AUTO: 4.21 X10(6)UL (ref 3.8–5.3)
TRIGL SERPL-MCNC: 101 MG/DL (ref 30–149)
VIT D+METAB SERPL-MCNC: 10.9 NG/ML (ref 30–100)
VLDLC SERPL CALC-MCNC: 20 MG/DL (ref 0–30)
WBC # BLD AUTO: 4.2 X10(3) UL (ref 4–11)

## 2020-07-15 PROCEDURE — 36415 COLL VENOUS BLD VENIPUNCTURE: CPT | Performed by: FAMILY MEDICINE

## 2020-07-15 PROCEDURE — 77067 SCR MAMMO BI INCL CAD: CPT | Performed by: FAMILY MEDICINE

## 2020-07-15 PROCEDURE — 85025 COMPLETE CBC W/AUTO DIFF WBC: CPT | Performed by: FAMILY MEDICINE

## 2020-07-15 PROCEDURE — 82306 VITAMIN D 25 HYDROXY: CPT | Performed by: FAMILY MEDICINE

## 2020-07-15 PROCEDURE — 80061 LIPID PANEL: CPT | Performed by: FAMILY MEDICINE

## 2020-07-15 PROCEDURE — 77063 BREAST TOMOSYNTHESIS BI: CPT | Performed by: FAMILY MEDICINE

## 2020-07-16 NOTE — PROGRESS NOTES
HPI:   Jensen Ford is a 52year old female who presents for a complete physical exam. Symptoms: denies discharge, itching, burning or dysuria, is S/P MAHIN, ovaries preserved. Regular SBE- yes,Sexually active- yes,  Contraception- hysterectomy.  STD his GLU 88 10/03/2019    GLU 97 09/14/2017    GLU 89 07/30/2016     Lab Results   Component Value Date    CHOLEST 254 (H) 07/15/2020    CHOLEST 230 (H) 10/03/2019    CHOLEST 228 (H) 09/14/2017     Lab Results   Component Value Date    HDL 45 07/15/2020    H Nausea. (Patient not taking: Reported on 6/21/2020 ) 30 tablet 1   • ergocalciferol 76919 units Oral Cap Take 1 capsule (50,000 Units total) by mouth once a week.  12 capsule 1      Past Medical History:   Diagnosis Date   • Anemia    • Brain mass 2009    R + migraine headaches  PSYCHE: + anxiety  HEMATOLOGIC: + anemia  ENDOCRINE: denies thyroid history,hx of pituitary issues, + vit D def.   ALL/ASTHMA: denies hx of allergy or asthma    EXAM:   /64   Pulse 100   Temp 97.8 °F (36.6 °C) (Oral)   Resp 16 and avoid processed foods.    - Rosuvastatin Calcium (CRESTOR) 10 MG Oral Tab; Take 1 tablet (10 mg total) by mouth daily. Dispense: 90 tablet; Refill: 0  - LIPID PANEL  - COMP METABOLIC PANEL (14)    4.  Impaired fasting glucose  - Eat low carb, increase

## 2020-07-20 ENCOUNTER — OFFICE VISIT (OUTPATIENT)
Dept: INTERNAL MEDICINE CLINIC | Facility: CLINIC | Age: 50
End: 2020-07-20
Payer: COMMERCIAL

## 2020-07-20 VITALS
WEIGHT: 293 LBS | SYSTOLIC BLOOD PRESSURE: 108 MMHG | DIASTOLIC BLOOD PRESSURE: 64 MMHG | BODY MASS INDEX: 41.02 KG/M2 | TEMPERATURE: 98 F | HEIGHT: 70.75 IN | RESPIRATION RATE: 16 BRPM | HEART RATE: 100 BPM

## 2020-07-20 DIAGNOSIS — D64.9 ANEMIA, UNSPECIFIED TYPE: ICD-10-CM

## 2020-07-20 DIAGNOSIS — Z00.00 ROUTINE GENERAL MEDICAL EXAMINATION AT A HEALTH CARE FACILITY: Primary | ICD-10-CM

## 2020-07-20 DIAGNOSIS — E55.9 VITAMIN D DEFICIENCY: ICD-10-CM

## 2020-07-20 DIAGNOSIS — E78.00 PURE HYPERCHOLESTEROLEMIA: ICD-10-CM

## 2020-07-20 DIAGNOSIS — R73.01 IMPAIRED FASTING GLUCOSE: ICD-10-CM

## 2020-07-20 PROCEDURE — 3074F SYST BP LT 130 MM HG: CPT | Performed by: FAMILY MEDICINE

## 2020-07-20 PROCEDURE — 99213 OFFICE O/P EST LOW 20 MIN: CPT | Performed by: FAMILY MEDICINE

## 2020-07-20 PROCEDURE — 99396 PREV VISIT EST AGE 40-64: CPT | Performed by: FAMILY MEDICINE

## 2020-07-20 PROCEDURE — 3008F BODY MASS INDEX DOCD: CPT | Performed by: FAMILY MEDICINE

## 2020-07-20 PROCEDURE — 3078F DIAST BP <80 MM HG: CPT | Performed by: FAMILY MEDICINE

## 2020-07-20 RX ORDER — ROSUVASTATIN CALCIUM 10 MG/1
10 TABLET, COATED ORAL DAILY
Qty: 90 TABLET | Refills: 0 | Status: SHIPPED | OUTPATIENT
Start: 2020-07-20 | End: 2020-09-08

## 2020-07-20 RX ORDER — ERGOCALCIFEROL 1.25 MG/1
50000 CAPSULE ORAL WEEKLY
Qty: 12 CAPSULE | Refills: 1 | Status: SHIPPED | OUTPATIENT
Start: 2020-07-20 | End: 2020-08-19

## 2020-07-26 ENCOUNTER — APPOINTMENT (OUTPATIENT)
Dept: GENERAL RADIOLOGY | Age: 50
End: 2020-07-26
Attending: PHYSICIAN ASSISTANT
Payer: COMMERCIAL

## 2020-07-26 ENCOUNTER — HOSPITAL ENCOUNTER (OUTPATIENT)
Age: 50
Discharge: HOME | End: 2020-07-26
Payer: COMMERCIAL

## 2020-07-26 VITALS
HEIGHT: 71 IN | OXYGEN SATURATION: 99 % | RESPIRATION RATE: 16 BRPM | TEMPERATURE: 99 F | HEART RATE: 80 BPM | SYSTOLIC BLOOD PRESSURE: 121 MMHG | DIASTOLIC BLOOD PRESSURE: 71 MMHG | BODY MASS INDEX: 41.02 KG/M2 | WEIGHT: 293 LBS

## 2020-07-26 DIAGNOSIS — M25.562 ACUTE PAIN OF LEFT KNEE: ICD-10-CM

## 2020-07-26 DIAGNOSIS — S83.421A SPRAIN OF LATERAL COLLATERAL LIGAMENT OF RIGHT KNEE, INITIAL ENCOUNTER: Primary | ICD-10-CM

## 2020-07-26 PROCEDURE — 73560 X-RAY EXAM OF KNEE 1 OR 2: CPT | Performed by: PHYSICIAN ASSISTANT

## 2020-07-26 PROCEDURE — 99213 OFFICE O/P EST LOW 20 MIN: CPT | Performed by: PHYSICIAN ASSISTANT

## 2020-07-26 RX ORDER — NAPROXEN 500 MG/1
500 TABLET ORAL 2 TIMES DAILY PRN
Qty: 20 TABLET | Refills: 0 | Status: SHIPPED | OUTPATIENT
Start: 2020-07-26 | End: 2020-08-02

## 2020-07-26 NOTE — ED PROVIDER NOTES
Patient Seen in: [facility]      History   Patient presents with:  Knee Pain    Stated Complaint: R knee pain    HPI    22-year-old female who comes in today complaining of 4-5 days of right knee pain especially to the lateral a General: She is not in acute distress. Appearance: She is well-developed. She is not diaphoretic. HENT:      Head: Normocephalic and atraumatic. Neck:      Musculoskeletal: Normal range of motion.    Cardiovascular:      Rate and Rhythm: Normal rate 12:46 PM     Finalized by (CST): Isaac Hernández MD on 7/26/2020 at 12:47 PM         MDM     discussed with the patient neoprene knee brace to  , rice instructions anti-inflammatory medications if persistent or worsening symptoms be reevaluated by or

## 2020-07-26 NOTE — ED INITIAL ASSESSMENT (HPI)
Patient report right knee and leg pain for about 4 days. Patient reports she thought it would go away but the pain is starting to keep her up at night.

## 2020-08-20 ENCOUNTER — PATIENT MESSAGE (OUTPATIENT)
Dept: INTERNAL MEDICINE CLINIC | Facility: CLINIC | Age: 50
End: 2020-08-20

## 2020-08-21 NOTE — TELEPHONE ENCOUNTER
Urgent care informed her to follow up with Ortho if her knee was not improving. So I suggest she does that.   [phone]  As for her swelling in her feet/legs, she will need to f/u with on

## 2020-08-21 NOTE — TELEPHONE ENCOUNTER
Danial Jarrett, RN 8/21/2020 9:41 AM CDT    Do you recommend office visit? Pt stated she did go to . Please advise.  TY    ----- Message -----  From: Hollis Gallardo  Sent: 8/20/2020 8:33 PM CDT  To: [facility] Clinical Staff  Subject: Non-Urgent Medical Katey Griffiths

## 2020-08-25 ENCOUNTER — OFFICE VISIT (OUTPATIENT)
Dept: INTERNAL MEDICINE CLINIC | Facility: CLINIC | Age: 50
End: 2020-08-25
Payer: COMMERCIAL

## 2020-08-25 VITALS
SYSTOLIC BLOOD PRESSURE: 130 MMHG | HEIGHT: 71 IN | OXYGEN SATURATION: 100 % | WEIGHT: 293 LBS | HEART RATE: 90 BPM | RESPIRATION RATE: 16 BRPM | BODY MASS INDEX: 41.02 KG/M2 | DIASTOLIC BLOOD PRESSURE: 82 MMHG | TEMPERATURE: 98 F

## 2020-08-25 DIAGNOSIS — R60.0 LOCALIZED EDEMA: Primary | ICD-10-CM

## 2020-08-25 DIAGNOSIS — K21.9 GASTROESOPHAGEAL REFLUX DISEASE WITHOUT ESOPHAGITIS: ICD-10-CM

## 2020-08-25 PROCEDURE — 3079F DIAST BP 80-89 MM HG: CPT | Performed by: FAMILY MEDICINE

## 2020-08-25 PROCEDURE — 3075F SYST BP GE 130 - 139MM HG: CPT | Performed by: FAMILY MEDICINE

## 2020-08-25 PROCEDURE — 99214 OFFICE O/P EST MOD 30 MIN: CPT | Performed by: FAMILY MEDICINE

## 2020-08-25 PROCEDURE — 3008F BODY MASS INDEX DOCD: CPT | Performed by: FAMILY MEDICINE

## 2020-08-25 RX ORDER — FUROSEMIDE 20 MG/1
20 TABLET ORAL DAILY
Qty: 30 TABLET | Refills: 0 | Status: SHIPPED | OUTPATIENT
Start: 2020-08-25 | End: 2020-09-17 | Stop reason: ALTCHOICE

## 2020-08-25 RX ORDER — CABERGOLINE 0.5 MG/1
TABLET ORAL
COMMUNITY
Start: 2020-07-16

## 2020-08-25 NOTE — PROGRESS NOTES
CHIEF COMPLAINT:     Patient presents with:  Leg Swelling: bilateral swelling in foot       HPI:   Ld Wilcox is a 52year old female . The patient complains of swelling of the lower legs. Has had for months since working from home.  Pt feels she is si TABLET BY MOUTH EVERY 2 HOURS AS NEEDED FOR MIGRAINE, USE AT ONSET, REPEAT ONE AFTER 2 HOURS, ONLY 2 TABLETS IN 24 HOURS MAX 9 tablet 0   • ergocalciferol 76795 units Oral Cap Take 1 capsule (50,000 Units total) by mouth once a week.  12 capsule 1   • Isome rashes,no suspicious lesions  HEAD: atraumatic, normocephalic  EYES: conjunctiva clear, EOM intact, PERRLA  NECK: supple, non-tender  LUNGS: clear to auscultation bilaterally, no wheezes or rhonchi. Breathing is non labored.   CARDIO: RRR without murmur  Ab

## 2020-09-04 ENCOUNTER — OFFICE VISIT (OUTPATIENT)
Dept: ORTHOPEDICS CLINIC | Facility: CLINIC | Age: 50
End: 2020-09-04
Payer: COMMERCIAL

## 2020-09-04 VITALS — OXYGEN SATURATION: 100 % | HEART RATE: 83 BPM

## 2020-09-04 DIAGNOSIS — M17.11 PRIMARY OSTEOARTHRITIS OF RIGHT KNEE: ICD-10-CM

## 2020-09-04 DIAGNOSIS — M25.561 CHRONIC PAIN OF RIGHT KNEE: Primary | ICD-10-CM

## 2020-09-04 DIAGNOSIS — G89.29 CHRONIC PAIN OF RIGHT KNEE: Primary | ICD-10-CM

## 2020-09-04 PROCEDURE — 99214 OFFICE O/P EST MOD 30 MIN: CPT | Performed by: FAMILY MEDICINE
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
## 2020-09-08 ENCOUNTER — HOSPITAL ENCOUNTER (OUTPATIENT)
Dept: GENERAL RADIOLOGY | Age: 50
Discharge: HOME | End: 2020-09-08
Attending: FAMILY MEDICINE
Payer: COMMERCIAL

## 2020-09-08 DIAGNOSIS — Z98.890 S/P RESECTION OF MENINGIOMA: ICD-10-CM

## 2020-09-08 DIAGNOSIS — G43.009 MIGRAINE WITHOUT AURA AND WITHOUT STATUS MIGRAINOSUS, NOT INTRACTABLE: ICD-10-CM

## 2020-09-08 DIAGNOSIS — G89.29 CHRONIC PAIN OF RIGHT KNEE: ICD-10-CM

## 2020-09-08 DIAGNOSIS — E78.00 PURE HYPERCHOLESTEROLEMIA: ICD-10-CM

## 2020-09-08 DIAGNOSIS — Z86.018 S/P RESECTION OF MENINGIOMA: ICD-10-CM

## 2020-09-08 DIAGNOSIS — M25.561 CHRONIC PAIN OF RIGHT KNEE: ICD-10-CM

## 2020-09-08 PROCEDURE — 73564 X-RAY EXAM KNEE 4 OR MORE: CPT | Performed by: FAMILY MEDICINE

## 2020-09-08 RX ORDER — TOPIRAMATE 50 MG/1
TABLET, FILM COATED ORAL
Qty: 270 TABLET | Refills: 0 | Status: SHIPPED | OUTPATIENT
Start: 2020-09-08 | End: 2020-12-14

## 2020-09-08 RX ORDER — ROSUVASTATIN CALCIUM 10 MG/1
TABLET, COATED ORAL
Qty: 90 TABLET | Refills: 0 | Status: SHIPPED | OUTPATIENT
Start: 2020-09-08 | End: 2020-12-21

## 2020-09-08 RX ORDER — ERENUMAB-AOOE 140 MG/ML
INJECTION, SOLUTION SUBCUTANEOUS
Qty: 1 PEN | Refills: 2 | Status: SHIPPED | OUTPATIENT
Start: 2020-09-08 | End: 2021-03-25

## 2020-09-08 NOTE — TELEPHONE ENCOUNTER
Rosuvastatin 10 mg  Last OV relevant to medication: 7-20-20  Last refill date: 7-20-20 #/refills: 0  When pt was asked to return for OV: CPX 1yr  Upcoming appt/reason: none  Recent labs: 7-15-20: Lipid

## 2020-09-08 NOTE — TELEPHONE ENCOUNTER
Medication: AIMOVIG 140 MG/ML ; TOPIRAMATE 50 MG    Date of last refill: 3/20/20 (#1/2), 5/1/20 (#270/0)  [place]     Last office visit: 5/22/20  Due back to clinic per last office note:  6 months  Date next office visit

## 2020-09-10 ENCOUNTER — TELEPHONE (OUTPATIENT)
Dept: PHYSICAL THERAPY | Age: 50
End: 2020-09-10

## 2020-09-10 ENCOUNTER — OFFICE VISIT (OUTPATIENT)
Dept: PHYSICAL THERAPY | Age: 50
End: 2020-09-10
Attending: FAMILY MEDICINE
Payer: COMMERCIAL

## 2020-09-10 DIAGNOSIS — G89.29 CHRONIC PAIN OF RIGHT KNEE: ICD-10-CM

## 2020-09-10 DIAGNOSIS — M25.561 CHRONIC PAIN OF RIGHT KNEE: ICD-10-CM

## 2020-09-10 PROCEDURE — 97161 PT EVAL LOW COMPLEX 20 MIN: CPT

## 2020-09-10 PROCEDURE — 97110 THERAPEUTIC EXERCISES: CPT

## 2020-09-10 NOTE — PROGRESS NOTES
LOWER EXTREMITY EVALUATION:   Referring Physician: Dr. Chari Barillas  Diagnosis: Chronic pain of right knee      Date of Service: 9/10/2020     PATIENT SUMMARY   Jorje Pham is a 52year old female who presents to therapy today with complaints of right knee deficiency, HSV infection, anemia, obesity, edema, and anxiety. Mitzy Petersen presents to physical therapy evaluation with primary c/o right knee pain that she describes as sharp when she engages in WB activities.  The results of the objective test WNL  ER: R; WNL L; WNL  IR: R; WNL (pain) L; WNL Flexion: R; 120 degrees (pain) L; 120 degrees  Extension: R; 0 degrees L; 0 degrees    DF: R; WNL L; WNL  PF: R; WNL L; WNL       Accessory motion: The patient shows mild hypomobility with global left patell balance    Charges: PT Eval Low Complexity, There Ex x 1      Total Timed Treatment: 10 min     Total Treatment Time: 45 min     Based on clinical rationale and outcome measures, this evaluation involved Low Complexity decision making due to 1-2 personal f you for your referral. Please co-sign or sign and return this letter via fax as soon as possible to [phone].  If you have any questions, please contact me at Dept: [phone]    Sincerely,  Electronically signed by therapist: Monica Mccann PT  [facility]

## 2020-09-15 ENCOUNTER — APPOINTMENT (OUTPATIENT)
Dept: PHYSICAL THERAPY | Age: 50
End: 2020-09-15
Attending: FAMILY MEDICINE
Payer: COMMERCIAL

## 2020-09-15 ENCOUNTER — TELEPHONE (OUTPATIENT)
Dept: PHYSICAL THERAPY | Age: 50
End: 2020-09-15

## 2020-09-15 NOTE — PROGRESS NOTES
Dx: Chronic pain of right knee    Insurance (Authorized # of Visits):  6          Authorizing Physician: Dr. Gordon Batres  Next MD visit: none scheduled  Fall Risk: standard         Precautions: n/a             Subjective: ***      Objective:     Strength/MMT:

## 2020-09-17 ENCOUNTER — TELEPHONE (OUTPATIENT)
Dept: INTERNAL MEDICINE CLINIC | Facility: CLINIC | Age: 50
End: 2020-09-17

## 2020-09-17 ENCOUNTER — APPOINTMENT (OUTPATIENT)
Dept: PHYSICAL THERAPY | Age: 50
End: 2020-09-17
Attending: FAMILY MEDICINE
Payer: COMMERCIAL

## 2020-09-17 DIAGNOSIS — I10 ESSENTIAL HYPERTENSION: ICD-10-CM

## 2020-09-17 RX ORDER — TRIAMTERENE AND HYDROCHLOROTHIAZIDE 37.5; 25 MG/1; MG/1
1 CAPSULE ORAL EVERY MORNING
Qty: 30 CAPSULE | Refills: 2 | Status: SHIPPED | OUTPATIENT
Start: 2020-09-17 | End: 2020-12-13

## 2020-09-17 NOTE — TELEPHONE ENCOUNTER
Patient calling b/c   Furosemide (Tab) LASIX 20 MG   Is not working for her recent change by Rubin Lr for her; can SM call in previous medication for her? Both of her feet are swollen, so she asks for this today [facility] in chart.   Call to verify can be sent

## 2020-09-17 NOTE — TELEPHONE ENCOUNTER
Please review previous message. Per last ov in our office:    \"1. Localized edema  - Stop dyazide, change to Lasix. Pt to wear support hose daily and elevate legs when sitting for long periods of time.  Pt to get up and move for a few minutes every 1-2

## 2020-09-17 NOTE — PROGRESS NOTES
Dx: Chronic pain of right knee    Insurance (Authorized # of Visits):  6          Authorizing Physician: Dr. Derik Torres         Next MD visit: none scheduled  Fall Risk: standard         Precautions: n/a              Subjective: ***        Objective:      Stren Total Timed Treatment: *** min              Total Treatment Time: *** min

## 2020-09-22 ENCOUNTER — HOSPITAL ENCOUNTER (OUTPATIENT)
Dept: GENERAL RADIOLOGY | Facility: HOSPITAL | Age: 50
Discharge: HOME | End: 2020-09-22
Attending: FAMILY MEDICINE
Payer: COMMERCIAL

## 2020-09-22 ENCOUNTER — APPOINTMENT (OUTPATIENT)
Dept: PHYSICAL THERAPY | Age: 50
End: 2020-09-22
Attending: FAMILY MEDICINE
Payer: COMMERCIAL

## 2020-09-22 ENCOUNTER — HOSPITAL ENCOUNTER (OUTPATIENT)
Dept: ULTRASOUND IMAGING | Facility: HOSPITAL | Age: 50
Discharge: HOME | End: 2020-09-22
Attending: FAMILY MEDICINE
Payer: COMMERCIAL

## 2020-09-22 ENCOUNTER — OFFICE VISIT (OUTPATIENT)
Dept: INTERNAL MEDICINE CLINIC | Facility: CLINIC | Age: 50
End: 2020-09-22
Payer: COMMERCIAL

## 2020-09-22 VITALS
WEIGHT: 293 LBS | DIASTOLIC BLOOD PRESSURE: 78 MMHG | SYSTOLIC BLOOD PRESSURE: 126 MMHG | TEMPERATURE: 98 F | BODY MASS INDEX: 41.02 KG/M2 | RESPIRATION RATE: 18 BRPM | HEART RATE: 82 BPM | HEIGHT: 71 IN

## 2020-09-22 DIAGNOSIS — M79.89 PAIN AND SWELLING OF LEFT LOWER LEG: ICD-10-CM

## 2020-09-22 DIAGNOSIS — M79.89 SWELLING OF LEFT FOOT: ICD-10-CM

## 2020-09-22 DIAGNOSIS — M25.572 ACUTE LEFT ANKLE PAIN: ICD-10-CM

## 2020-09-22 DIAGNOSIS — M79.672 LEFT FOOT PAIN: ICD-10-CM

## 2020-09-22 DIAGNOSIS — M79.662 PAIN AND SWELLING OF LEFT LOWER LEG: ICD-10-CM

## 2020-09-22 DIAGNOSIS — M79.672 LEFT FOOT PAIN: Primary | ICD-10-CM

## 2020-09-22 PROCEDURE — 3078F DIAST BP <80 MM HG: CPT | Performed by: FAMILY MEDICINE

## 2020-09-22 PROCEDURE — 93971 EXTREMITY STUDY: CPT | Performed by: FAMILY MEDICINE

## 2020-09-22 PROCEDURE — 3074F SYST BP LT 130 MM HG: CPT | Performed by: FAMILY MEDICINE

## 2020-09-22 PROCEDURE — 3008F BODY MASS INDEX DOCD: CPT | Performed by: FAMILY MEDICINE

## 2020-09-22 PROCEDURE — 73630 X-RAY EXAM OF FOOT: CPT | Performed by: FAMILY MEDICINE

## 2020-09-22 PROCEDURE — 99213 OFFICE O/P EST LOW 20 MIN: CPT | Performed by: FAMILY MEDICINE

## 2020-09-22 NOTE — PROGRESS NOTES
Dx: Chronic pain of right knee    Insurance (Authorized # of Visits):  8          Authorizing Physician: Dr. Mara Tejeda MD visit: none scheduled  Fall Risk: standard         Precautions: n/a              Subjective: ***        Objective:      Stren                   Total Timed Treatment: *** min              Total Treatment Time: *** min

## 2020-09-22 NOTE — PROGRESS NOTES
CHIEF COMPLAINT:     Patient presents with:  Swelling: Pt states orginally the Swelling was on R foot but that went away and got better. L foot is now swelling for about 2 weeks, pt has tenderness. HPI:   Davonte Hurtado is a 52year old female .   The daily as needed. 90 capsule 0   • Multiple Vitamins-Minerals (MULTIVITAMIN & MINERAL OR) Take 1 tablet by mouth.           Past Medical History:   Diagnosis Date   • Anemia    • Brain mass 2009    Reported by patient   • Ectopic pregnancy    • Lipid screeni Left foot pain  (primary encounter diagnosis)  Swelling of left foot  Acute left ankle pain  Pain and swelling of left lower leg    No orders of the defined types were placed in this encounter.       Meds & Refills for this Visit:  Requested Prescriptio

## 2020-09-24 ENCOUNTER — OFFICE VISIT (OUTPATIENT)
Dept: PHYSICAL THERAPY | Age: 50
End: 2020-09-24
Attending: FAMILY MEDICINE
Payer: COMMERCIAL

## 2020-09-24 DIAGNOSIS — M25.561 CHRONIC PAIN OF RIGHT KNEE: ICD-10-CM

## 2020-09-24 DIAGNOSIS — G89.29 CHRONIC PAIN OF RIGHT KNEE: ICD-10-CM

## 2020-09-24 PROCEDURE — 97140 MANUAL THERAPY 1/> REGIONS: CPT

## 2020-09-24 PROCEDURE — 97110 THERAPEUTIC EXERCISES: CPT

## 2020-09-24 NOTE — PROGRESS NOTES
Dx: Chronic pain of right knee    Insurance (Authorized # of Visits):  8          Authorizing Physician: Dr. Mara Tejeda MD visit: none scheduled  Fall Risk: standard         Precautions: n/a              Subjective:  The patient comes into therapy w decrease risk of falls on uneven surfaces such as grass and gravel   · Pt will improve quad strength to 5/5 to ascend 1 flight of stairs reciprocally without UE assist   · Pt will increase hip and knee strength to grossly 4+/5 to be able to get up and down

## 2020-09-28 DIAGNOSIS — F41.9 ANXIETY: ICD-10-CM

## 2020-09-28 RX ORDER — DIAZEPAM 5 MG/1
5 TABLET ORAL NIGHTLY PRN
Qty: 30 TABLET | Refills: 0 | Status: SHIPPED | OUTPATIENT
Start: 2020-09-28 | End: 2020-12-10

## 2020-09-28 RX ORDER — VALACYCLOVIR HYDROCHLORIDE 500 MG/1
500 TABLET, FILM COATED ORAL DAILY
Qty: 90 TABLET | Refills: 0 | Status: SHIPPED | OUTPATIENT
Start: 2020-09-28 | End: 2020-12-21

## 2020-09-28 NOTE — TELEPHONE ENCOUNTER
Valacyclovir hcl 500 mg  Last OV relevant to medication: 7-20-20  Last refill date: 5-15-19 #/refills: 0  When pt was asked to return for OV: 1 yr  Upcoming appt/reason: none  Recent labs: none    Diazepam 5 mg  Last OV relevant to medication: 7-20-20  [facility]

## 2020-09-29 ENCOUNTER — APPOINTMENT (OUTPATIENT)
Dept: PHYSICAL THERAPY | Age: 50
End: 2020-09-29
Attending: FAMILY MEDICINE
Payer: COMMERCIAL

## 2020-09-30 DIAGNOSIS — R60.0 LOCALIZED EDEMA: ICD-10-CM

## 2020-09-30 RX ORDER — FUROSEMIDE 20 MG/1
TABLET ORAL
Qty: 30 TABLET | Refills: 0 | OUTPATIENT
Start: 2020-09-30

## 2020-10-01 ENCOUNTER — APPOINTMENT (OUTPATIENT)
Dept: PHYSICAL THERAPY | Age: 50
End: 2020-10-01
Attending: FAMILY MEDICINE
Payer: COMMERCIAL

## 2020-11-05 ENCOUNTER — TELEPHONE (OUTPATIENT)
Dept: NEUROLOGY | Facility: CLINIC | Age: 50
End: 2020-11-05

## 2020-11-05 DIAGNOSIS — G43.009 MIGRAINE WITHOUT AURA AND WITHOUT STATUS MIGRAINOSUS, NOT INTRACTABLE: Primary | ICD-10-CM

## 2020-11-05 DIAGNOSIS — G43.709 CHRONIC MIGRAINE W/O AURA W/O STATUS MIGRAINOSUS, NOT INTRACTABLE: ICD-10-CM

## 2020-11-05 NOTE — TELEPHONE ENCOUNTER
Spoke with patient who states she is at work and will call back. PA for Aimovig 140mg started on CMM, Key: [id number].

## 2020-11-09 NOTE — TELEPHONE ENCOUNTER
Completed PA on CMM:    CARLO FOSTER (Key: [id number])   This request has been approved. CaseId:[id number];Status:Approved; Review Type:Prior Auth; Coverage Start Date:10/10/2020; Coverage End Date:11/09/2021;

## 2020-12-10 DIAGNOSIS — F41.9 ANXIETY: ICD-10-CM

## 2020-12-10 DIAGNOSIS — E55.9 VITAMIN D DEFICIENCY: ICD-10-CM

## 2020-12-10 RX ORDER — DIAZEPAM 5 MG/1
5 TABLET ORAL NIGHTLY PRN
Qty: 30 TABLET | Refills: 0 | Status: SHIPPED | OUTPATIENT
Start: 2020-12-10 | End: 2021-10-05

## 2020-12-10 RX ORDER — DIAZEPAM 5 MG/1
5 TABLET ORAL NIGHTLY PRN
Qty: 30 TABLET | Refills: 0 | OUTPATIENT
Start: 2020-12-10

## 2020-12-10 RX ORDER — ERGOCALCIFEROL 1.25 MG/1
50000 CAPSULE ORAL WEEKLY
Qty: 12 CAPSULE | Refills: 1 | OUTPATIENT
Start: 2020-12-10

## 2020-12-10 NOTE — TELEPHONE ENCOUNTER
Ergocalciferol   Filled 7-20-20  Qty 12  1 refill  LOV 7-20-20   Pt to use otc vitamin D 2000 units daily, recheck vit. D levels in 1 month, orders in the system.     Diazepam 5 mg  Filled 9-28-20  Qty 30  0 refills  LOV 7-20-20

## 2020-12-11 DIAGNOSIS — I10 ESSENTIAL HYPERTENSION: ICD-10-CM

## 2020-12-12 ENCOUNTER — PATIENT MESSAGE (OUTPATIENT)
Dept: NEUROLOGY | Facility: CLINIC | Age: 50
End: 2020-12-12

## 2020-12-12 DIAGNOSIS — G43.009 MIGRAINE WITHOUT AURA AND WITHOUT STATUS MIGRAINOSUS, NOT INTRACTABLE: ICD-10-CM

## 2020-12-12 DIAGNOSIS — Z86.018 S/P RESECTION OF MENINGIOMA: ICD-10-CM

## 2020-12-12 DIAGNOSIS — Z98.890 S/P RESECTION OF MENINGIOMA: ICD-10-CM

## 2020-12-13 RX ORDER — TRIAMTERENE AND HYDROCHLOROTHIAZIDE 37.5; 25 MG/1; MG/1
CAPSULE ORAL
Qty: 90 CAPSULE | Refills: 0 | Status: SHIPPED | OUTPATIENT
Start: 2020-12-13 | End: 2021-04-22

## 2020-12-14 RX ORDER — TOPIRAMATE 50 MG/1
TABLET, FILM COATED ORAL
Qty: 270 TABLET | Refills: 0 | Status: SHIPPED | OUTPATIENT
Start: 2020-12-14 | End: 2021-04-08

## 2020-12-14 NOTE — TELEPHONE ENCOUNTER
Medication: TOPIRAMATE 50 MG Oral Tab    Date of last refill: 9/8/2020 (#270/0)  [place]    Last office visit: 5/22/2020  Due back to clinic per last office note:  Not specified  Date next office visit scheduled:  No

## 2020-12-14 NOTE — TELEPHONE ENCOUNTER
From: Dipesh Gray  To: Arlen Kimble MD  Sent: 12/12/2020 10:18 AM CST  Subject: Non-Urgent Medical Question    My job is suggesting that I get the COVID vaccine next week. What are your medical recommendations for me?

## 2020-12-17 NOTE — TELEPHONE ENCOUNTER
Lester rodgers MD  [facility] Patch Nurse 15 hours ago (5:04 PM)     f I remember correctly patient likely had a severe allergic reaction to [facility] but otherwise I do not see any contraindication from a neurological standpoint.  Thanks    Message text

## 2020-12-20 DIAGNOSIS — E78.00 PURE HYPERCHOLESTEROLEMIA: ICD-10-CM

## 2020-12-21 RX ORDER — ROSUVASTATIN CALCIUM 10 MG/1
TABLET, COATED ORAL
Qty: 90 TABLET | Refills: 0 | Status: SHIPPED | OUTPATIENT
Start: 2020-12-21

## 2020-12-21 RX ORDER — VALACYCLOVIR HYDROCHLORIDE 500 MG/1
TABLET, FILM COATED ORAL
Qty: 90 TABLET | Refills: 0 | Status: SHIPPED | OUTPATIENT
Start: 2020-12-21 | End: 2021-05-13

## 2020-12-21 NOTE — TELEPHONE ENCOUNTER
Rosuvastatin 10 mg failed protocol due to  Cholesterol Medication Protocol [id number]/20/2020 12:25 AM   ALT < 80 Protocol Details    ALT resulted within past year    Filled 9-8-20  Qty 90  0 refills  No upcoming appt.    LOV 7-20-20    Valacyclovir 500 mg  F

## 2021-02-17 ENCOUNTER — TELEPHONE (OUTPATIENT)
Dept: NEUROLOGY | Facility: CLINIC | Age: 51
End: 2021-02-17

## 2021-02-17 ENCOUNTER — PATIENT MESSAGE (OUTPATIENT)
Dept: NEUROLOGY | Facility: CLINIC | Age: 51
End: 2021-02-17

## 2021-02-17 DIAGNOSIS — G43.709 CHRONIC MIGRAINE W/O AURA W/O STATUS MIGRAINOSUS, NOT INTRACTABLE: ICD-10-CM

## 2021-02-17 DIAGNOSIS — G43.009 MIGRAINE WITHOUT AURA AND WITHOUT STATUS MIGRAINOSUS, NOT INTRACTABLE: Primary | ICD-10-CM

## 2021-02-17 DIAGNOSIS — R11.0 NAUSEA: ICD-10-CM

## 2021-02-17 RX ORDER — ONDANSETRON 4 MG/1
4 TABLET, FILM COATED ORAL EVERY 8 HOURS PRN
Qty: 60 TABLET | Refills: 0 | Status: SHIPPED | OUTPATIENT
Start: 2021-02-17 | End: 2021-04-08 | Stop reason: ALTCHOICE

## 2021-02-17 NOTE — TELEPHONE ENCOUNTER
From: Hollis Marking  To: Mendel Morin, MD  Sent: 2/17/2021 6:37 AM CST  Subject: Prescription Question    Good morning,    I am in need of something for nausea. I have a migraine that started Monday evening. It is getting better but I am nauseated.

## 2021-02-17 NOTE — TELEPHONE ENCOUNTER
Received PA request for ondansetron; plan limits to 9 tablets/month and Dr. Herman Bueno issued #[phone]. PA initiated via [facility] and sent to plan, awaiting clinical questions.  Ref # [id number]

## 2021-02-28 DIAGNOSIS — E55.9 VITAMIN D DEFICIENCY: ICD-10-CM

## 2021-03-01 RX ORDER — ERGOCALCIFEROL 1.25 MG/1
CAPSULE ORAL
Qty: 12 CAPSULE | Refills: 1 | OUTPATIENT
Start: 2021-03-01

## 2021-03-03 ENCOUNTER — OFFICE VISIT (OUTPATIENT)
Dept: WOUND CARE | Facility: HOSPITAL | Age: 51
End: 2021-03-03
Attending: NURSE PRACTITIONER
Payer: COMMERCIAL

## 2021-03-03 DIAGNOSIS — L98.498 NON-PRESSURE CHRONIC ULCER OF SKIN OF OTHER SITES WITH OTHER SPECIFIED SEVERITY (HCC): ICD-10-CM

## 2021-03-03 DIAGNOSIS — T81.31XA DISRUPTION OF EXTERNAL OPERATION (SURGICAL) WOUND, NOT ELSEWHERE CLASSIFIED, INITIAL ENCOUNTER: Primary | ICD-10-CM

## 2021-03-03 DIAGNOSIS — L92.9 GRANULOMATOUS DISORDER OF THE SKIN AND SUBCUTANEOUS TISSUE, UNSPECIFIED: ICD-10-CM

## 2021-03-03 DIAGNOSIS — T83.718A: ICD-10-CM

## 2021-03-03 DIAGNOSIS — I10 ESSENTIAL HYPERTENSION: ICD-10-CM

## 2021-03-03 PROCEDURE — 17250 CHEM CAUT OF GRANLTJ TISSUE: CPT

## 2021-03-03 PROCEDURE — 87070 CULTURE OTHR SPECIMN AEROBIC: CPT

## 2021-03-03 PROCEDURE — 87205 SMEAR GRAM STAIN: CPT

## 2021-03-03 PROCEDURE — 99214 OFFICE O/P EST MOD 30 MIN: CPT

## 2021-03-03 PROCEDURE — 87077 CULTURE AEROBIC IDENTIFY: CPT

## 2021-03-03 NOTE — PROGRESS NOTES
Subjective    Chief Complaint  This information was obtained from the patient  Patient is here for an initial consult. She presents with a recurrent opening of her abdominal surgical wound that re-opened on Sunday.  She does have pain that she currently rat 3-3-21 REEVALUATION:  Patient returns today, she was last seen in may of last year. From that time until this last weekend the wound remained closed without drainage. she has not gone to see surgeon yet due to covid. she states she is back to work.   she Patient has a surgical history of:  Tubal ligation (2003)  Hysterectomy (12/15/2013)  Hernia surgery (7/14/2008)  Meningioma removal - brain tumor (9/22/2016)  Salpingectomy-rt tube removed for ectopic pregnancy  mesh placement (with hysterectomy)    Denia Wound #2 Abdomen Recurrence is a chronic Full Thickness Abscess and has received a status of Not Healed. Initial wound encounter measurements are 1cm length x 0.6cm width with no measurable depth, with an area of 0.6 sq cm . Hypergranulation was noted.  No (Encounter Diagnosis) L92.9 - Granulomatous disorder of the skin and subcutaneous tissue, unspecified        Procedures    Wound #2  Wound #2 (Surgical Wound) is located on the abdomen recurrence.  A non-selective chemical/enzymatic debridement was performe

## 2021-03-04 RX ORDER — TRIAMTERENE AND HYDROCHLOROTHIAZIDE 37.5; 25 MG/1; MG/1
1 CAPSULE ORAL EVERY MORNING
Qty: 90 CAPSULE | Refills: 0 | OUTPATIENT
Start: 2021-03-04

## 2021-03-12 DIAGNOSIS — G43.009 MIGRAINE WITHOUT AURA AND WITHOUT STATUS MIGRAINOSUS, NOT INTRACTABLE: ICD-10-CM

## 2021-03-12 NOTE — TELEPHONE ENCOUNTER
Pt needs appt.  Sent [employer] message    Medication: AIMOVIG 140 MG/ML Subcutaneous Solution Auto-injector    Date of last refill: 9/8/20 (#1/2)  [place]    Last office visit: 5/22/20  Due back to clinic per last office

## 2021-03-17 ENCOUNTER — APPOINTMENT (OUTPATIENT)
Dept: WOUND CARE | Facility: HOSPITAL | Age: 51
End: 2021-03-17
Attending: NURSE PRACTITIONER
Payer: COMMERCIAL

## 2021-03-19 ENCOUNTER — APPOINTMENT (OUTPATIENT)
Dept: WOUND CARE | Facility: HOSPITAL | Age: 51
End: 2021-03-19
Attending: NURSE PRACTITIONER
Payer: COMMERCIAL

## 2021-03-25 RX ORDER — ERENUMAB-AOOE 140 MG/ML
INJECTION, SOLUTION SUBCUTANEOUS
Qty: 1 PEN | Refills: 1 | Status: SHIPPED | OUTPATIENT
Start: 2021-03-25 | End: 2021-05-21

## 2021-04-06 ENCOUNTER — LAB ENCOUNTER (OUTPATIENT)
Dept: LAB | Age: 51
End: 2021-04-06
Attending: FAMILY MEDICINE
Payer: COMMERCIAL

## 2021-04-06 PROCEDURE — 80061 LIPID PANEL: CPT | Performed by: FAMILY MEDICINE

## 2021-04-06 PROCEDURE — 82306 VITAMIN D 25 HYDROXY: CPT | Performed by: FAMILY MEDICINE

## 2021-04-06 PROCEDURE — 80053 COMPREHEN METABOLIC PANEL: CPT | Performed by: FAMILY MEDICINE

## 2021-04-06 PROCEDURE — 36415 COLL VENOUS BLD VENIPUNCTURE: CPT | Performed by: FAMILY MEDICINE

## 2021-04-06 PROCEDURE — 83036 HEMOGLOBIN GLYCOSYLATED A1C: CPT | Performed by: FAMILY MEDICINE

## 2021-04-06 PROCEDURE — 85025 COMPLETE CBC W/AUTO DIFF WBC: CPT | Performed by: FAMILY MEDICINE

## 2021-04-08 ENCOUNTER — OFFICE VISIT (OUTPATIENT)
Dept: NEUROLOGY | Facility: CLINIC | Age: 51
End: 2021-04-08
Payer: COMMERCIAL

## 2021-04-08 VITALS
HEART RATE: 78 BPM | RESPIRATION RATE: 16 BRPM | BODY MASS INDEX: 45 KG/M2 | WEIGHT: 293 LBS | SYSTOLIC BLOOD PRESSURE: 128 MMHG | DIASTOLIC BLOOD PRESSURE: 78 MMHG

## 2021-04-08 DIAGNOSIS — I10 ESSENTIAL HYPERTENSION: ICD-10-CM

## 2021-04-08 DIAGNOSIS — G43.709 CHRONIC MIGRAINE W/O AURA W/O STATUS MIGRAINOSUS, NOT INTRACTABLE: Primary | ICD-10-CM

## 2021-04-08 PROCEDURE — 3074F SYST BP LT 130 MM HG: CPT | Performed by: OTHER

## 2021-04-08 PROCEDURE — 99213 OFFICE O/P EST LOW 20 MIN: CPT | Performed by: OTHER

## 2021-04-08 PROCEDURE — 3078F DIAST BP <80 MM HG: CPT | Performed by: OTHER

## 2021-04-08 PROCEDURE — 96372 THER/PROPH/DIAG INJ SC/IM: CPT | Performed by: OTHER

## 2021-04-08 RX ORDER — DEXAMETHASONE SODIUM PHOSPHATE 10 MG/ML
10 INJECTION, SOLUTION INTRAMUSCULAR; INTRAVENOUS ONCE
Status: COMPLETED | OUTPATIENT
Start: 2021-04-08 | End: 2021-04-08

## 2021-04-08 RX ORDER — KETOROLAC TROMETHAMINE 30 MG/ML
30 INJECTION, SOLUTION INTRAMUSCULAR; INTRAVENOUS ONCE
Status: COMPLETED | OUTPATIENT
Start: 2021-04-08 | End: 2021-04-08

## 2021-04-08 RX ORDER — TRIAMTERENE AND HYDROCHLOROTHIAZIDE 37.5; 25 MG/1; MG/1
1 CAPSULE ORAL EVERY MORNING
Qty: 90 CAPSULE | Refills: 0 | Status: CANCELLED | OUTPATIENT
Start: 2021-04-08

## 2021-04-08 RX ORDER — TOPIRAMATE 100 MG/1
TABLET, FILM COATED ORAL
Qty: 45 TABLET | Refills: 5 | Status: SHIPPED | OUTPATIENT
Start: 2021-04-08

## 2021-04-08 RX ADMIN — KETOROLAC TROMETHAMINE 30 MG: 30 INJECTION, SOLUTION INTRAMUSCULAR; INTRAVENOUS at 16:28:00

## 2021-04-08 RX ADMIN — DEXAMETHASONE SODIUM PHOSPHATE 10 MG: 10 INJECTION, SOLUTION INTRAMUSCULAR; INTRAVENOUS at 16:27:00

## 2021-04-08 NOTE — PROGRESS NOTES
Patient reports she has a migraine right now. Patient reports 4-5 big migraines per month. Reports she has some intermittent headaches.

## 2021-04-08 NOTE — PATIENT INSTRUCTIONS
After your visit at the [facility] office  today, please direct any follow up questions or medication needs to the staff in our [address] [place] [address] office so that your concerns may be promptly addressed.   We are available through [employer] or at the numbers below: be picked up in office. • Please allow the office 2-3 business days to fill the prescription. • Patient must present photo ID at time of . PLEASE NOTE: PRESCRIPTIONS MUST BE PICKED UP PRIOR TO 3:00PM MONDAY-FRIDAY    Scheduling Tests:     If your submitting forms to office staff. • Form completion may require an additional fee. • A signed Release of Information (SANDRA) must be on file before forms may be submitted. When dropping off forms, please ask the  for this paper.    • Failure

## 2021-04-08 NOTE — PROGRESS NOTES
HPI:    Patient ID: Royce Isabel is a 52year old female. HPI     Speedy Haines is a 52year old who presented for migraine follow up and currently having a migraine headache likely due to rainy weather.  Migraine are relatively stable overall on Aimovig not as other systems reviewed and are negative. Current Outpatient Medications   Medication Sig Dispense Refill   • ergocalciferol 1.25 MG (14808 UT) Oral Cap Take 1 capsule (50,000 Units total) by mouth once a week.  4 capsule 5   • topiramate 100 MG Or normal.   Skin: Skin is warm and dry. Psychiatric:normal mood and affect. NEUROLOGICAL: This patient is alert and orientated x 3. Speech is fluent with intact comprehension. Pupils equally round and reactive to light. 3+ brisk bilaterally. EOMs intact.

## 2021-04-09 NOTE — TELEPHONE ENCOUNTER
Triamterene-HCTZ 37.5-25 MG Oral Cap         Sig: Take 1 capsule by mouth every morning.     Disp:  90 capsule    Refills:  0    Start: 4/8/2021    Class: Normal    Non-formulary For: Essential hypertension    Last ordered: 3 months ago by FEDERICO Fisher

## 2021-04-22 ENCOUNTER — TELEPHONE (OUTPATIENT)
Dept: INTERNAL MEDICINE CLINIC | Facility: CLINIC | Age: 51
End: 2021-04-22

## 2021-04-22 ENCOUNTER — OFFICE VISIT (OUTPATIENT)
Dept: INTERNAL MEDICINE CLINIC | Facility: CLINIC | Age: 51
End: 2021-04-22
Payer: COMMERCIAL

## 2021-04-22 ENCOUNTER — LAB ENCOUNTER (OUTPATIENT)
Dept: LAB | Age: 51
End: 2021-04-22
Attending: FAMILY MEDICINE
Payer: COMMERCIAL

## 2021-04-22 VITALS
TEMPERATURE: 98 F | HEIGHT: 71 IN | SYSTOLIC BLOOD PRESSURE: 142 MMHG | WEIGHT: 293 LBS | HEART RATE: 81 BPM | DIASTOLIC BLOOD PRESSURE: 88 MMHG | BODY MASS INDEX: 41.02 KG/M2 | RESPIRATION RATE: 16 BRPM | OXYGEN SATURATION: 99 %

## 2021-04-22 DIAGNOSIS — I10 ESSENTIAL HYPERTENSION: ICD-10-CM

## 2021-04-22 DIAGNOSIS — Z01.818 PRE-OP EXAMINATION: Primary | ICD-10-CM

## 2021-04-22 DIAGNOSIS — S31.105D OPEN WOUND OF UMBILICAL REGION, SUBSEQUENT ENCOUNTER: ICD-10-CM

## 2021-04-22 DIAGNOSIS — Z01.818 PRE-OP EXAMINATION: ICD-10-CM

## 2021-04-22 PROCEDURE — 84443 ASSAY THYROID STIM HORMONE: CPT

## 2021-04-22 PROCEDURE — 3008F BODY MASS INDEX DOCD: CPT | Performed by: FAMILY MEDICINE

## 2021-04-22 PROCEDURE — 85730 THROMBOPLASTIN TIME PARTIAL: CPT | Performed by: FAMILY MEDICINE

## 2021-04-22 PROCEDURE — 85610 PROTHROMBIN TIME: CPT | Performed by: FAMILY MEDICINE

## 2021-04-22 PROCEDURE — 36415 COLL VENOUS BLD VENIPUNCTURE: CPT | Performed by: FAMILY MEDICINE

## 2021-04-22 PROCEDURE — 84439 ASSAY OF FREE THYROXINE: CPT

## 2021-04-22 PROCEDURE — 93000 ELECTROCARDIOGRAM COMPLETE: CPT | Performed by: FAMILY MEDICINE

## 2021-04-22 PROCEDURE — 3077F SYST BP >= 140 MM HG: CPT | Performed by: FAMILY MEDICINE

## 2021-04-22 PROCEDURE — 99214 OFFICE O/P EST MOD 30 MIN: CPT | Performed by: FAMILY MEDICINE

## 2021-04-22 PROCEDURE — 3079F DIAST BP 80-89 MM HG: CPT | Performed by: FAMILY MEDICINE

## 2021-04-22 RX ORDER — TRIAMTERENE AND HYDROCHLOROTHIAZIDE 37.5; 25 MG/1; MG/1
1 CAPSULE ORAL EVERY MORNING
Qty: 90 CAPSULE | Refills: 0 | Status: SHIPPED | OUTPATIENT
Start: 2021-04-22 | End: 2022-01-10

## 2021-04-22 NOTE — TELEPHONE ENCOUNTER
Visit note and labs faxed to [facility]esthesia Staff at [phone] - Fax confirmation received. Faxed paperwork placed in pod folder.

## 2021-04-22 NOTE — TELEPHONE ENCOUNTER
EKG completed in office today. EKG copy given to patient. Original report given to [facility] and 2nd copy send to scan.

## 2021-04-22 NOTE — PROGRESS NOTES
CC: Preop exam.    Leighton Ervin is a 52year old female who presents for a pre-operative physical exam by Dr. Devyn Franco's  request. Patient is to have debridement and irrigation with possible incision and drainage of wound of umbilicus, secondar capsule by mouth 3 (three) times daily as needed. 90 capsule 0   • Multiple Vitamins-Minerals (MULTIVITAMIN & MINERAL OR) Take 1 tablet by mouth.           Allergies:   Emgality [Galcanezu*    SHORTNESS OF BREATH   Past Medical History:   Diagnosis Date   • pain  NEURO: +migraine, denies headaches  PSYCHE: denies depression or anxiety  HEMATOLOGIC:  hx of anemia  ENDOCRINE: denies thyroid history,+ obesity  ALL/ASTHMA: denies hx of allergy or asthma    EXAM:   /88 (BP Location: Right arm, Patient Positi three times a week and keep log  - strongly recommended limiting sodium intake and fastfood  - patient to follow-up in 3-4 weeks for routine BP check.     Pre-op examination  (primary encounter diagnosis)  Essential hypertension  Open wound of umbilical reg

## 2021-05-03 NOTE — TELEPHONE ENCOUNTER
S:  Patient states she has had a migraine since yesterday. \"One of my worse ones. \" Also, c/o dizziness, nausea, loose bowels.   B:  Patient has taken bolus of Emgality,   A:  Patient states she has not tried Imitrex because she did not know if she could w
Spoke with patient and relayed that Dr. Frantz Valdivia signed Rx for Zofran. Pt verbalized understanding and states she already picked up the medication. Answered all questions and pt was encouraged to call office with any additional questions or concerns.
04-May-2021 06:25

## 2021-05-14 RX ORDER — VALACYCLOVIR HYDROCHLORIDE 500 MG/1
500 TABLET, FILM COATED ORAL DAILY
Qty: 90 TABLET | Refills: 0 | Status: SHIPPED | OUTPATIENT
Start: 2021-05-14

## 2021-05-14 NOTE — TELEPHONE ENCOUNTER
Protocol passed   Medication(s) to Refill:   Requested Prescriptions     Pending Prescriptions Disp Refills   • valACYclovir HCl 500 MG Oral Tab 90 tablet 0     Sig: Take 1 tablet (500 mg total) by mouth daily.        LOV: 4/22/2021   RTC: 3-4 weeks     Las

## 2021-05-21 DIAGNOSIS — G43.009 MIGRAINE WITHOUT AURA AND WITHOUT STATUS MIGRAINOSUS, NOT INTRACTABLE: ICD-10-CM

## 2021-05-21 RX ORDER — ERENUMAB-AOOE 140 MG/ML
INJECTION, SOLUTION SUBCUTANEOUS
Qty: 1 PEN | Refills: 2 | Status: SHIPPED | OUTPATIENT
Start: 2021-05-21 | End: 2021-08-16

## 2021-05-21 NOTE — TELEPHONE ENCOUNTER
Medication: AIMOVIG 140 MG/ML Subcutaneous Solution Auto-injector    Date of last refill: 03/25/2021 (#1 pen/1)  [place]A    Last office visit: 04/08/2021  Due back to clinic per last office note:  Around 10/08/2021

## 2021-07-27 ENCOUNTER — TELEPHONE (OUTPATIENT)
Dept: INTERNAL MEDICINE CLINIC | Facility: CLINIC | Age: 51
End: 2021-07-27

## 2021-07-27 NOTE — TELEPHONE ENCOUNTER
Spoke with patient stating she was exposed to someone positive COVID on Sunday, patient denies any symptoms at this time. Patient advised to continue to monitor symptoms for 10-14 days and quarantine.  Patient advised if does show any symptoms she should ca

## 2021-07-27 NOTE — TELEPHONE ENCOUNTER
Patient called and stated she was exposed to Covid on Sunday. Patient reports no current sxs at this time. Patient would like a nurse to call her with advice on what she should do since she was exposed.  Please advise

## 2021-07-31 ENCOUNTER — HOSPITAL ENCOUNTER (EMERGENCY)
Age: 51
Discharge: HOME | End: 2021-07-31
Attending: EMERGENCY MEDICINE
Payer: COMMERCIAL

## 2021-07-31 ENCOUNTER — APPOINTMENT (OUTPATIENT)
Dept: GENERAL RADIOLOGY | Age: 51
End: 2021-07-31
Attending: NURSE PRACTITIONER
Payer: COMMERCIAL

## 2021-07-31 VITALS
DIASTOLIC BLOOD PRESSURE: 79 MMHG | BODY MASS INDEX: 41.02 KG/M2 | HEART RATE: 84 BPM | HEIGHT: 71 IN | WEIGHT: 293 LBS | SYSTOLIC BLOOD PRESSURE: 139 MMHG | RESPIRATION RATE: 16 BRPM | TEMPERATURE: 97 F | OXYGEN SATURATION: 100 %

## 2021-07-31 DIAGNOSIS — M25.512 ACUTE PAIN OF LEFT SHOULDER: Primary | ICD-10-CM

## 2021-07-31 PROCEDURE — 99283 EMERGENCY DEPT VISIT LOW MDM: CPT

## 2021-07-31 PROCEDURE — 73030 X-RAY EXAM OF SHOULDER: CPT | Performed by: NURSE PRACTITIONER

## 2021-08-01 NOTE — ED PROVIDER NOTES
Patient Seen in: THE [facility] In [place]      History   Patient presents with:  Arm or Hand Injury    Stated Complaint: left shoulder pain for a couple weeks denies known injury    HPI/Subjective:   71-year-old female presents today wit No             Review of Systems    Positive for stated complaint: left shoulder pain for a couple weeks denies known injury  Other systems are as noted in HPI. Constitutional and vital signs reviewed.       All other systems reviewed and negative except a Pain is worse when abducting arm and sleeping. FINDINGS:   There is no evidence of acute osseous injuries, subluxations or dislocations. No significant arthritic changes are identified.   No significant soft tissue swelling or soft tissue lesions are ap

## 2021-08-15 DIAGNOSIS — G43.009 MIGRAINE WITHOUT AURA AND WITHOUT STATUS MIGRAINOSUS, NOT INTRACTABLE: ICD-10-CM

## 2021-08-16 RX ORDER — ERENUMAB-AOOE 140 MG/ML
INJECTION, SOLUTION SUBCUTANEOUS
Qty: 1 ML | Refills: 2 | Status: SHIPPED | OUTPATIENT
Start: 2021-08-16 | End: 2021-12-01

## 2021-08-25 ENCOUNTER — TELEPHONE (OUTPATIENT)
Dept: NEUROLOGY | Facility: CLINIC | Age: 51
End: 2021-08-25

## 2021-08-25 NOTE — TELEPHONE ENCOUNTER
pt would like to know when the last time her FMLA paperwork was filled out; pt would like to know if it is time for new paperwork; pls call

## 2021-08-26 ENCOUNTER — TELEPHONE (OUTPATIENT)
Dept: NEUROLOGY | Facility: CLINIC | Age: 51
End: 2021-08-26

## 2021-08-26 NOTE — TELEPHONE ENCOUNTER
Per PSR: Pt faxed FMLA paperwork to be completed.  Placed in [address] completed, placed in provider folder for review and signature

## 2021-10-05 DIAGNOSIS — G43.009 MIGRAINE WITHOUT AURA AND WITHOUT STATUS MIGRAINOSUS, NOT INTRACTABLE: ICD-10-CM

## 2021-10-05 DIAGNOSIS — F41.9 ANXIETY: ICD-10-CM

## 2021-10-05 RX ORDER — ONDANSETRON 4 MG/1
4 TABLET, FILM COATED ORAL EVERY 8 HOURS PRN
Qty: 30 TABLET | Refills: 0 | Status: SHIPPED | OUTPATIENT
Start: 2021-10-05 | End: 2021-12-07

## 2021-10-05 RX ORDER — ERENUMAB-AOOE 140 MG/ML
140 INJECTION, SOLUTION SUBCUTANEOUS
Qty: 1 ML | Refills: 0 | OUTPATIENT
Start: 2021-10-05

## 2021-10-05 RX ORDER — DIAZEPAM 5 MG/1
5 TABLET ORAL NIGHTLY PRN
Qty: 30 TABLET | Refills: 0 | Status: SHIPPED | OUTPATIENT
Start: 2021-10-05

## 2021-10-05 NOTE — TELEPHONE ENCOUNTER
Spoke with [facility] pharmacist(Corinne)who states patient has refills for Aimovig on file.     Medication: Ondansetron HCl (ZOFRAN) 4 mg , Aimovig    Date of last refill: 3/7/19 (#30/1); 8/16/21 (#1/2)  [place]     Last office v

## 2021-10-11 ENCOUNTER — TELEPHONE (OUTPATIENT)
Dept: ORTHOPEDICS CLINIC | Facility: CLINIC | Age: 51
End: 2021-10-11

## 2021-10-11 DIAGNOSIS — M25.512 LEFT SHOULDER PAIN, UNSPECIFIED CHRONICITY: Primary | ICD-10-CM

## 2021-10-11 NOTE — TELEPHONE ENCOUNTER
Patient is scheduled on 10/13/2021 with Sher Adams for left shoulder pain. Please advise if imaging is needed.

## 2021-10-11 NOTE — TELEPHONE ENCOUNTER
Requisite for xray order  Reviewed patients chart, xray orders are required.  Order placed for left shoulder xrays  Future Appointments   Date Time Provider Minna Juan   10/13/2021  3:15 PM NAP XR RM1 NAP XRAY [facility] [place]   10/13/2021  3:40 PM Orquidea Spears

## 2021-10-19 ENCOUNTER — OFFICE VISIT (OUTPATIENT)
Dept: ORTHOPEDICS CLINIC | Facility: CLINIC | Age: 51
End: 2021-10-19
Payer: COMMERCIAL

## 2021-10-19 ENCOUNTER — HOSPITAL ENCOUNTER (OUTPATIENT)
Dept: GENERAL RADIOLOGY | Age: 51
Discharge: HOME | End: 2021-10-19
Attending: PHYSICIAN ASSISTANT
Payer: COMMERCIAL

## 2021-10-19 VITALS — HEART RATE: 76 BPM | OXYGEN SATURATION: 100 %

## 2021-10-19 DIAGNOSIS — R63.5 INCREASED BODY WEIGHT: ICD-10-CM

## 2021-10-19 DIAGNOSIS — M75.02 ADHESIVE CAPSULITIS OF LEFT SHOULDER: Primary | ICD-10-CM

## 2021-10-19 DIAGNOSIS — M25.512 LEFT SHOULDER PAIN, UNSPECIFIED CHRONICITY: ICD-10-CM

## 2021-10-19 DIAGNOSIS — M67.912 TENDINOPATHY OF LEFT ROTATOR CUFF: ICD-10-CM

## 2021-10-19 PROCEDURE — 73030 X-RAY EXAM OF SHOULDER: CPT | Performed by: PHYSICIAN ASSISTANT

## 2021-10-19 PROCEDURE — 99213 OFFICE O/P EST LOW 20 MIN: CPT | Performed by: PHYSICIAN ASSISTANT

## 2021-10-19 NOTE — H&P
[facility] - ORTHOPEDICS  [facility] [id number] [phone]     NEW PATIENT VISIT - HISTORY AND PHYSICAL EXAMINATION     Name: Shruthi Carrillo   MRN: [id number]  Date: 10/19/2021     CC: Left shoulde Brother    • Other (colon cancer[other]) Other         uncle   • Breast Cancer Paternal Grandmother         unknown age        ALLERGIES:  Emgality [Galcanezumab-Gnlm]    MEDICATIONS:   Current Outpatient Medications   Medication Sig Dispense Refill   • di Pulse: 76   SpO2: 100%     Estimated body mass index is 43.93 kg/m² as calculated from the following:    Height as of 7/31/21: 5' 11\" (1.803 m). Weight as of 7/31/21: 315 lb (142.9 kg).     Physical Exam  Constitutional:       Appearance: Normal appea maneuvers. Radiographic Examination/Diagnostics:    I personally viewed, independently interpreted and radiology report was reviewed.       XR SHOULDER, COMPLETE (MIN 2 VIEWS), LEFT (CPT=73030)    Result Date: 10/19/2021  PROCEDURE:  XR SHOULDER, COMPLE strengthening, range of motion, functional improvement, and return to baseline activity. The patient had the opportunity to ask questions and all questions were answered appropriately. We discussed if not improved in four weeks, we will obtain an MRI.

## 2021-10-20 ENCOUNTER — OFFICE VISIT (OUTPATIENT)
Dept: INTERNAL MEDICINE CLINIC | Facility: CLINIC | Age: 51
End: 2021-10-20
Payer: COMMERCIAL

## 2021-10-20 VITALS
HEART RATE: 82 BPM | HEIGHT: 71 IN | RESPIRATION RATE: 16 BRPM | DIASTOLIC BLOOD PRESSURE: 80 MMHG | WEIGHT: 293 LBS | BODY MASS INDEX: 41.02 KG/M2 | TEMPERATURE: 98 F | SYSTOLIC BLOOD PRESSURE: 132 MMHG

## 2021-10-20 DIAGNOSIS — I10 ESSENTIAL HYPERTENSION: Primary | ICD-10-CM

## 2021-10-20 DIAGNOSIS — Z00.00 LABORATORY EXAMINATION ORDERED AS PART OF A ROUTINE GENERAL MEDICAL EXAMINATION: ICD-10-CM

## 2021-10-20 DIAGNOSIS — E55.9 VITAMIN D DEFICIENCY: ICD-10-CM

## 2021-10-20 DIAGNOSIS — Z12.31 ENCOUNTER FOR SCREENING MAMMOGRAM FOR BREAST CANCER: ICD-10-CM

## 2021-10-20 PROCEDURE — 3079F DIAST BP 80-89 MM HG: CPT | Performed by: FAMILY MEDICINE

## 2021-10-20 PROCEDURE — 99214 OFFICE O/P EST MOD 30 MIN: CPT | Performed by: FAMILY MEDICINE

## 2021-10-20 PROCEDURE — 3008F BODY MASS INDEX DOCD: CPT | Performed by: FAMILY MEDICINE

## 2021-10-20 PROCEDURE — 3075F SYST BP GE 130 - 139MM HG: CPT | Performed by: FAMILY MEDICINE

## 2021-10-20 RX ORDER — VALSARTAN 80 MG/1
80 TABLET ORAL DAILY
Qty: 30 TABLET | Refills: 1 | Status: SHIPPED | OUTPATIENT
Start: 2021-10-20

## 2021-10-20 NOTE — PROGRESS NOTES
CHIEF COMPLAINT:     Patient presents with:  Hypertension: Pt checks BP at home, 148/84-86 past couple days. HPI:   Ajit Amor is a 54year old female   Patient presents for recheck of their hypertension.  Pt has been taking medications as instructe (three) times daily as needed.  90 capsule 0      Past Medical History:   Diagnosis Date   • Anemia    • Brain mass 2009    Reported by patient   • Ectopic pregnancy    • Lipid screening 4/14/2012   • Meningioma ([facility] [address] 2008   • Migraines    • Normal delivery Vitamin D, 25-Hydroxy      TSH W Reflex To Free T4      Meds & Refills for this Visit:  Requested Prescriptions     Signed Prescriptions Disp Refills   • valsartan (DIOVAN) 80 MG Oral Tab 30 tablet 1     Sig: Take 1 tablet (80 mg total) by mouth daily.

## 2021-10-22 ENCOUNTER — ORDER TRANSCRIPTION (OUTPATIENT)
Dept: PHYSICAL THERAPY | Facility: HOSPITAL | Age: 51
End: 2021-10-22

## 2021-10-22 DIAGNOSIS — M67.912 TENDINOPATHY OF LEFT ROTATOR CUFF: ICD-10-CM

## 2021-10-22 DIAGNOSIS — M75.02 ADHESIVE CAPSULITIS OF LEFT SHOULDER: Primary | ICD-10-CM

## 2021-10-28 ENCOUNTER — OFFICE VISIT (OUTPATIENT)
Dept: ORTHOPEDICS CLINIC | Facility: CLINIC | Age: 51
End: 2021-10-28
Payer: COMMERCIAL

## 2021-10-28 VITALS — BODY MASS INDEX: 41.02 KG/M2 | WEIGHT: 293 LBS | HEIGHT: 71 IN | OXYGEN SATURATION: 99 % | HEART RATE: 106 BPM

## 2021-10-28 DIAGNOSIS — M67.912 TENDINOPATHY OF LEFT ROTATOR CUFF: ICD-10-CM

## 2021-10-28 DIAGNOSIS — M75.02 ADHESIVE CAPSULITIS OF LEFT SHOULDER: Primary | ICD-10-CM

## 2021-10-28 PROCEDURE — 20610 DRAIN/INJ JOINT/BURSA W/O US: CPT | Performed by: PHYSICIAN ASSISTANT

## 2021-10-28 PROCEDURE — 99214 OFFICE O/P EST MOD 30 MIN: CPT | Performed by: PHYSICIAN ASSISTANT

## 2021-10-28 PROCEDURE — 3008F BODY MASS INDEX DOCD: CPT | Performed by: PHYSICIAN ASSISTANT

## 2021-10-28 RX ORDER — TRIAMCINOLONE ACETONIDE 40 MG/ML
40 INJECTION, SUSPENSION INTRA-ARTICULAR; INTRAMUSCULAR ONCE
Status: COMPLETED | OUTPATIENT
Start: 2021-10-28 | End: 2021-10-28

## 2021-10-28 RX ADMIN — TRIAMCINOLONE ACETONIDE 40 MG: 40 INJECTION, SUSPENSION INTRA-ARTICULAR; INTRAMUSCULAR at 14:40:00

## 2021-10-28 NOTE — PROCEDURES
Left Shoulder Glenohumeral Joint Injection    Name: Zack Espinal   MRN: [id number]  Date: 10/28/2021     Clinical Indications:   Adhesive Capsulitis.      After informed consent, the injection site was marked, sterilized with topical chlorhexidine antisept

## 2021-10-28 NOTE — PROGRESS NOTES
[facility]  [address]       Name: Rhys Garcia   MRN: [id number]  Date: 10/28/2021     REASON FOR VISIT: Follow up for left shoulder adhesive capsulitis. is intact, warm and dry.    Cervical:  Full ROM  Spurling's                           Negative     Deformity:                         none  Atrophy:                             none    Scapular winging:            Negative     Palpation: chronicity  PATIENT STATED HISTORY: (As transcribed by Technologist)  Patient complains of left shoulder pain since July. No injury. FINDINGS:  Positioning is suboptimal due to patient pain. No fracture.   Mild offset of the acromioclavicular joint is b

## 2021-11-05 ENCOUNTER — TELEPHONE (OUTPATIENT)
Dept: PHYSICAL THERAPY | Facility: HOSPITAL | Age: 51
End: 2021-11-05

## 2021-11-06 ENCOUNTER — HOSPITAL ENCOUNTER (OUTPATIENT)
Dept: MAMMOGRAPHY | Age: 51
Discharge: HOME | End: 2021-11-06
Attending: FAMILY MEDICINE
Payer: COMMERCIAL

## 2021-11-06 ENCOUNTER — LAB ENCOUNTER (OUTPATIENT)
Dept: LAB | Age: 51
End: 2021-11-06
Attending: FAMILY MEDICINE
Payer: COMMERCIAL

## 2021-11-06 DIAGNOSIS — Z12.31 ENCOUNTER FOR SCREENING MAMMOGRAM FOR BREAST CANCER: ICD-10-CM

## 2021-11-06 PROCEDURE — 36415 COLL VENOUS BLD VENIPUNCTURE: CPT | Performed by: FAMILY MEDICINE

## 2021-11-06 PROCEDURE — 82306 VITAMIN D 25 HYDROXY: CPT | Performed by: FAMILY MEDICINE

## 2021-11-06 PROCEDURE — 83036 HEMOGLOBIN GLYCOSYLATED A1C: CPT | Performed by: FAMILY MEDICINE

## 2021-11-06 PROCEDURE — 80053 COMPREHEN METABOLIC PANEL: CPT | Performed by: FAMILY MEDICINE

## 2021-11-06 PROCEDURE — 85025 COMPLETE CBC W/AUTO DIFF WBC: CPT | Performed by: FAMILY MEDICINE

## 2021-11-06 PROCEDURE — 80061 LIPID PANEL: CPT | Performed by: FAMILY MEDICINE

## 2021-11-06 PROCEDURE — 77063 BREAST TOMOSYNTHESIS BI: CPT | Performed by: FAMILY MEDICINE

## 2021-11-06 PROCEDURE — 77067 SCR MAMMO BI INCL CAD: CPT | Performed by: FAMILY MEDICINE

## 2021-11-06 PROCEDURE — 84443 ASSAY THYROID STIM HORMONE: CPT | Performed by: FAMILY MEDICINE

## 2021-11-08 ENCOUNTER — TELEPHONE (OUTPATIENT)
Dept: INTERNAL MEDICINE CLINIC | Facility: CLINIC | Age: 51
End: 2021-11-08

## 2021-11-08 ENCOUNTER — OFFICE VISIT (OUTPATIENT)
Dept: PHYSICAL THERAPY | Age: 51
End: 2021-11-08
Attending: PHYSICIAN ASSISTANT
Payer: COMMERCIAL

## 2021-11-08 DIAGNOSIS — M67.912 TENDINOPATHY OF LEFT ROTATOR CUFF: ICD-10-CM

## 2021-11-08 DIAGNOSIS — R79.9 ELEVATED BUN: ICD-10-CM

## 2021-11-08 DIAGNOSIS — M75.02 ADHESIVE CAPSULITIS OF LEFT SHOULDER: ICD-10-CM

## 2021-11-08 DIAGNOSIS — E55.9 VITAMIN D DEFICIENCY: ICD-10-CM

## 2021-11-08 DIAGNOSIS — R73.03 PREDIABETES: ICD-10-CM

## 2021-11-08 DIAGNOSIS — E78.00 PURE HYPERCHOLESTEROLEMIA: Primary | ICD-10-CM

## 2021-11-08 PROCEDURE — 97161 PT EVAL LOW COMPLEX 20 MIN: CPT

## 2021-11-08 PROCEDURE — 97140 MANUAL THERAPY 1/> REGIONS: CPT

## 2021-11-08 PROCEDURE — 97110 THERAPEUTIC EXERCISES: CPT

## 2021-11-08 RX ORDER — ERGOCALCIFEROL 1.25 MG/1
50000 CAPSULE ORAL WEEKLY
Qty: 12 CAPSULE | Refills: 1 | Status: SHIPPED | OUTPATIENT
Start: 2021-11-08

## 2021-11-08 NOTE — TELEPHONE ENCOUNTER
----- Message from LUIS Madison sent at 11/6/2021  7:26 PM CDT -----  Hgba1c higher than before. Pt remains pre diabetic. Pt needs to continue to watch her diet and loose weight. Recheck hgba1c in 6 months. BUN ,globulin up, Pt to increase fluids.  Rec

## 2021-11-08 NOTE — PROGRESS NOTES
SHOULDER EVALUATION:   Referring Physician: DORON Ng  Diagnosis: L shoulder adhesive capsulitis and RTC tendinopathy    Date of Service: 11/8/2021     PATIENT SUMMARY   Cristela Rodney is a 54year old female who presents to therapy today with comp shoulder pain and tightness. The results of the objective tests and measures show significant limitations in L shoulder ABD and flexion AROM with significant hypomobility in L shoulder GH posterior>inferior glides.  She has mild L shoulder weakness, more li importance of remaining active. Discussed sleeping positions to help improve quality of sleep. Recommended use of heat at home in the morning before HEP and in the evening before going to sleep.    Manual Therapy provided for STM to L supraspinatus and ante have any questions, please contact me at Dept: [phone]    Sincerely,  Electronically signed by therapist: Mellisa Cardenas, PT  [de-identified] certification required: Yes  I certify the need for these services furnished under this plan of treatment and

## 2021-11-10 ENCOUNTER — TELEPHONE (OUTPATIENT)
Dept: PHYSICAL THERAPY | Facility: HOSPITAL | Age: 51
End: 2021-11-10

## 2021-11-10 ENCOUNTER — APPOINTMENT (OUTPATIENT)
Dept: PHYSICAL THERAPY | Age: 51
End: 2021-11-10
Attending: PHYSICIAN ASSISTANT
Payer: COMMERCIAL

## 2021-11-11 DIAGNOSIS — I10 ESSENTIAL HYPERTENSION: ICD-10-CM

## 2021-11-11 RX ORDER — VALSARTAN 80 MG/1
TABLET ORAL
Qty: 30 TABLET | Refills: 1 | OUTPATIENT
Start: 2021-11-11

## 2021-11-11 NOTE — TELEPHONE ENCOUNTER
Sent [employer] message to pt. Pt is to make BP f/u appt. And we will refill in office. New medication to pt. Valsartan 90 mg  Filled 10-20-21  Qty 30  1 refill  No upcoming appt. LOV 10-20-21  RTC 1-2 mo.

## 2021-11-15 ENCOUNTER — APPOINTMENT (OUTPATIENT)
Dept: PHYSICAL THERAPY | Age: 51
End: 2021-11-15
Attending: PHYSICIAN ASSISTANT
Payer: COMMERCIAL

## 2021-11-15 ENCOUNTER — TELEPHONE (OUTPATIENT)
Dept: PHYSICAL THERAPY | Age: 51
End: 2021-11-15

## 2021-11-17 ENCOUNTER — APPOINTMENT (OUTPATIENT)
Dept: PHYSICAL THERAPY | Age: 51
End: 2021-11-17
Attending: PHYSICIAN ASSISTANT
Payer: COMMERCIAL

## 2021-11-18 ENCOUNTER — TELEPHONE (OUTPATIENT)
Dept: PHYSICAL THERAPY | Age: 51
End: 2021-11-18

## 2021-11-18 NOTE — TELEPHONE ENCOUNTER
No Showed visit on 11/17. Called to discuss. No Answer. Left message that due to 2 no shows in a row without communication with office, future PT visits will be cancelled.  If she is still interested in receiving care at this office, she should call her PT

## 2021-11-22 ENCOUNTER — APPOINTMENT (OUTPATIENT)
Dept: PHYSICAL THERAPY | Age: 51
End: 2021-11-22
Attending: PHYSICIAN ASSISTANT
Payer: COMMERCIAL

## 2021-11-24 ENCOUNTER — APPOINTMENT (OUTPATIENT)
Dept: PHYSICAL THERAPY | Age: 51
End: 2021-11-24
Attending: PHYSICIAN ASSISTANT
Payer: COMMERCIAL

## 2021-11-29 ENCOUNTER — APPOINTMENT (OUTPATIENT)
Dept: PHYSICAL THERAPY | Age: 51
End: 2021-11-29
Attending: PHYSICIAN ASSISTANT
Payer: COMMERCIAL

## 2021-12-01 ENCOUNTER — APPOINTMENT (OUTPATIENT)
Dept: PHYSICAL THERAPY | Age: 51
End: 2021-12-01
Attending: PHYSICIAN ASSISTANT
Payer: COMMERCIAL

## 2021-12-01 DIAGNOSIS — G43.009 MIGRAINE WITHOUT AURA AND WITHOUT STATUS MIGRAINOSUS, NOT INTRACTABLE: ICD-10-CM

## 2021-12-01 NOTE — TELEPHONE ENCOUNTER
Medication: AIMOVIG 140 MG/ML Subcutaneous Solution Auto-injector       Date of last refill: 08/16/2021 (#1 mL/2)  [place]     Last office visit: 04/08/2021  Due back to clinic per last office note:  Around 10/08/202

## 2021-12-02 RX ORDER — ERENUMAB-AOOE 140 MG/ML
140 INJECTION, SOLUTION SUBCUTANEOUS
Qty: 1 ML | Refills: 2 | Status: SHIPPED | OUTPATIENT
Start: 2021-12-02

## 2021-12-06 ENCOUNTER — APPOINTMENT (OUTPATIENT)
Dept: PHYSICAL THERAPY | Age: 51
End: 2021-12-06
Attending: PHYSICIAN ASSISTANT
Payer: COMMERCIAL

## 2021-12-07 DIAGNOSIS — G43.009 MIGRAINE WITHOUT AURA AND WITHOUT STATUS MIGRAINOSUS, NOT INTRACTABLE: ICD-10-CM

## 2021-12-07 RX ORDER — ONDANSETRON 4 MG/1
TABLET, FILM COATED ORAL
Qty: 30 TABLET | Refills: 0 | Status: SHIPPED | OUTPATIENT
Start: 2021-12-07

## 2021-12-07 NOTE — TELEPHONE ENCOUNTER
Medication: ondansetron (ZOFRAN) 4 mg tablet     Date of last refill: 10/05/2021 (#30/0)  [place]     Last office visit: 04/08/2021  Due back to clinic per last office note:  Around 10/08/2021  Date next office visit

## 2021-12-08 ENCOUNTER — APPOINTMENT (OUTPATIENT)
Dept: PHYSICAL THERAPY | Age: 51
End: 2021-12-08
Attending: PHYSICIAN ASSISTANT
Payer: COMMERCIAL

## 2021-12-08 RX ORDER — ONDANSETRON 4 MG/1
4 TABLET, FILM COATED ORAL EVERY 8 HOURS PRN
Qty: 30 TABLET | Refills: 0 | OUTPATIENT
Start: 2021-12-08

## 2021-12-14 ENCOUNTER — HOSPITAL ENCOUNTER (EMERGENCY)
Age: 51
Discharge: HOME | End: 2021-12-14
Attending: EMERGENCY MEDICINE
Payer: COMMERCIAL

## 2021-12-14 VITALS
OXYGEN SATURATION: 100 % | RESPIRATION RATE: 16 BRPM | SYSTOLIC BLOOD PRESSURE: 110 MMHG | WEIGHT: 293 LBS | BODY MASS INDEX: 41.02 KG/M2 | TEMPERATURE: 97 F | DIASTOLIC BLOOD PRESSURE: 69 MMHG | HEIGHT: 71 IN | HEART RATE: 80 BPM

## 2021-12-14 DIAGNOSIS — G43.909 MIGRAINE WITHOUT STATUS MIGRAINOSUS, NOT INTRACTABLE, UNSPECIFIED MIGRAINE TYPE: Primary | ICD-10-CM

## 2021-12-14 PROCEDURE — 96361 HYDRATE IV INFUSION ADD-ON: CPT

## 2021-12-14 PROCEDURE — 99284 EMERGENCY DEPT VISIT MOD MDM: CPT

## 2021-12-14 PROCEDURE — 96375 TX/PRO/DX INJ NEW DRUG ADDON: CPT

## 2021-12-14 PROCEDURE — 85025 COMPLETE CBC W/AUTO DIFF WBC: CPT | Performed by: EMERGENCY MEDICINE

## 2021-12-14 PROCEDURE — 96374 THER/PROPH/DIAG INJ IV PUSH: CPT

## 2021-12-14 PROCEDURE — 80053 COMPREHEN METABOLIC PANEL: CPT | Performed by: EMERGENCY MEDICINE

## 2021-12-14 RX ORDER — METOCLOPRAMIDE HYDROCHLORIDE 5 MG/ML
10 INJECTION INTRAMUSCULAR; INTRAVENOUS ONCE
Status: COMPLETED | OUTPATIENT
Start: 2021-12-14 | End: 2021-12-14

## 2021-12-14 RX ORDER — DIPHENHYDRAMINE HYDROCHLORIDE 50 MG/ML
25 INJECTION INTRAMUSCULAR; INTRAVENOUS ONCE
Status: COMPLETED | OUTPATIENT
Start: 2021-12-14 | End: 2021-12-14

## 2021-12-14 RX ORDER — KETOROLAC TROMETHAMINE 30 MG/ML
30 INJECTION, SOLUTION INTRAMUSCULAR; INTRAVENOUS ONCE
Status: COMPLETED | OUTPATIENT
Start: 2021-12-14 | End: 2021-12-14

## 2021-12-14 RX ORDER — DEXAMETHASONE SODIUM PHOSPHATE 4 MG/ML
10 VIAL (ML) INJECTION ONCE
Status: COMPLETED | OUTPATIENT
Start: 2021-12-14 | End: 2021-12-14

## 2021-12-14 NOTE — ED INITIAL ASSESSMENT (HPI)
Migraine for about 1 wks- unable to get in contact with neuro -- vertigo also for 1 wk--  No relief with home meds-- n/v/d

## 2021-12-14 NOTE — ED PROVIDER NOTES
Patient Seen in: THE [facility] In [place]      History   Patient presents with:  Headache  Dizziness    Stated Complaint: migraine headache x 1 week, vertigo.     Subjective:   HPI    43-year-old female presents reporting 1 week of migrai complaint: migraine headache x 1 week, vertigo. Other systems are as noted in HPI. Constitutional and vital signs reviewed. All other systems reviewed and negative except as noted above.     Physical Exam     ED Triage Vitals [12/14/21 0818]    Benadryl, Decadron, Reglan, Toradol ordered. IV fluids ordered. Will reevaluate. Patient reports resolution of her headache after above medications. She would like to go home. She states she will call her neurologist to set up follow-up.   Instructed

## 2021-12-22 ENCOUNTER — TELEPHONE (OUTPATIENT)
Dept: INTERNAL MEDICINE CLINIC | Facility: CLINIC | Age: 51
End: 2021-12-22

## 2021-12-22 NOTE — TELEPHONE ENCOUNTER
[facility], [facility]     Ok to keep in office? Pt in ER 12/14 for migraine.  No covid testing completed     Future Appointments   Date Time Provider Minna Juan   12/23/2021  3:20 PM MD Karan Whipple

## 2021-12-22 NOTE — TELEPHONE ENCOUNTER
Spoke with patient reporting s/s a few weeks ago, seen in ED 12/14.     Vertigo and migraine improved at this time  Intermittent light-headedness  Fatigue/drained  Sinus congestion - patient states this has improved at this time  Intermittent dry cough  Int

## 2021-12-23 ENCOUNTER — OFFICE VISIT (OUTPATIENT)
Dept: NEUROLOGY | Facility: CLINIC | Age: 51
End: 2021-12-23
Payer: COMMERCIAL

## 2021-12-23 VITALS — WEIGHT: 293 LBS | BODY MASS INDEX: 43 KG/M2

## 2021-12-23 DIAGNOSIS — G43.009 MIGRAINE WITHOUT AURA AND WITHOUT STATUS MIGRAINOSUS, NOT INTRACTABLE: Primary | ICD-10-CM

## 2021-12-23 DIAGNOSIS — R53.83 OTHER FATIGUE: ICD-10-CM

## 2021-12-23 PROCEDURE — 99213 OFFICE O/P EST LOW 20 MIN: CPT | Performed by: OTHER

## 2021-12-23 NOTE — PATIENT INSTRUCTIONS
After your visit at the [facility] office  today, please direct any follow up questions or medication needs to the staff in our [place] office so that your concerns may be promptly addressed.   We are available through [employer] or at the numbers below: must be picked up in office. • Please allow the office 2-3 business days to fill the prescription. • Patient must present photo ID at time of . PLEASE NOTE: PRESCRIPTIONS MUST BE PICKED UP PRIOR TO 3:00PM MONDAY-FRIDAY    Scheduling Tests:     If submitting forms to office staff. • Form completion may require an additional fee. • A signed Release of Information (SANDRA) must be on file before forms may be submitted. When dropping off forms, please ask the  for this paper.    • Failure

## 2021-12-23 NOTE — PROGRESS NOTES
HPI:    Patient ID: Monica Saez is a 54year old female. Gayle Ceballos is a 52year old who presented for migraine follow up and currently having a migraine headache likely due to rainy weather.  Migraine are relatively stable overall on Aimovig Review of Systems   Constitutional: Negative. HENT: Negative. Eyes: Negative. Respiratory: Negative. Cardiovascular: Negative. Gastrointestinal: Negative. Neurological: Positive for headaches.    All other systems reviewed and are ne Effort normal and breath sounds normal.   Abdominal: Soft. Bowel sounds are normal.   Skin: Skin is warm and dry. Psychiatric:normal mood and affect. NEUROLOGICAL: This patient is alert and orientated x 3. Speech is fluent with intact comprehension.  Pu

## 2021-12-27 ENCOUNTER — PATIENT MESSAGE (OUTPATIENT)
Dept: NEUROLOGY | Facility: CLINIC | Age: 51
End: 2021-12-27

## 2021-12-27 NOTE — TELEPHONE ENCOUNTER
From: Saima Sherman  To: John Lamb MD  Sent: 12/27/2021 10:24 AM CST  Subject: Telework/Doctor Note    Good morning,    Thank you for the note for work but unfortunately it is not sufficient.  My supervisor is requesting a note for me being off from

## 2021-12-28 ENCOUNTER — TELEMEDICINE (OUTPATIENT)
Dept: INTERNAL MEDICINE CLINIC | Facility: CLINIC | Age: 51
End: 2021-12-28
Payer: COMMERCIAL

## 2021-12-28 DIAGNOSIS — R06.02 SOB (SHORTNESS OF BREATH): Primary | ICD-10-CM

## 2021-12-28 DIAGNOSIS — R05.8 COUGH VARIANT NOT DUE TO ASTHMA: ICD-10-CM

## 2021-12-28 DIAGNOSIS — R53.83 FATIGUE, UNSPECIFIED TYPE: ICD-10-CM

## 2021-12-28 PROCEDURE — 99213 OFFICE O/P EST LOW 20 MIN: CPT | Performed by: FAMILY MEDICINE

## 2021-12-28 NOTE — PROGRESS NOTES
Virtual Video Check-In     This visit is conducted using Telemedicine with live, interactive video and audio. Prince Padilla, who has verified his/her identification by name and , verbally consents to a Virtual/Telephone Check-In visit on 21. total) into the skin every 30 (thirty) days. 1 mL 2   • ergocalciferol 1.25 MG (20875 UT) Oral Cap Take 1 capsule (50,000 Units total) by mouth once a week. 12 capsule 1   • valsartan (DIOVAN) 80 MG Oral Tab Take 1 tablet (80 mg total) by mouth daily.  30 t Age of Onset   • Other (colon ca[other]) Father    • Cancer Father    • Heart Disease Mother    • High Blood Pressure Mother    • Stroke Mother    • Lung Disorder Brother    • Cancer Brother    • Other (colon cancer[other]) Other         uncle   • Breast C tests and decision-making. Appropriate medical decision-making and tests are ordered as detailed in the plan of care above. Coding/billing information is submitted for this visit based on complexity of care and/or time spent for the visit.       [employer]

## 2021-12-29 ENCOUNTER — HOSPITAL ENCOUNTER (OUTPATIENT)
Dept: GENERAL RADIOLOGY | Age: 51
Discharge: HOME | End: 2021-12-29
Attending: FAMILY MEDICINE
Payer: COMMERCIAL

## 2021-12-29 ENCOUNTER — PATIENT MESSAGE (OUTPATIENT)
Dept: INTERNAL MEDICINE CLINIC | Facility: CLINIC | Age: 51
End: 2021-12-29

## 2021-12-29 DIAGNOSIS — R50.9 FEVER, UNSPECIFIED FEVER CAUSE: Primary | ICD-10-CM

## 2021-12-29 DIAGNOSIS — R05.8 COUGH VARIANT NOT DUE TO ASTHMA: ICD-10-CM

## 2021-12-29 DIAGNOSIS — R06.02 SOB (SHORTNESS OF BREATH): ICD-10-CM

## 2021-12-29 PROCEDURE — 71046 X-RAY EXAM CHEST 2 VIEWS: CPT | Performed by: FAMILY MEDICINE

## 2021-12-29 RX ORDER — AZITHROMYCIN 250 MG/1
TABLET, FILM COATED ORAL
Qty: 6 TABLET | Refills: 0 | Status: SHIPPED | OUTPATIENT
Start: 2021-12-29 | End: 2022-01-03

## 2021-12-29 NOTE — TELEPHONE ENCOUNTER
From: Kimberly Silva  To: LUIS Mckenzie  Sent: 12/29/2021 1:48 AM CST  Subject: Fever    Charu I woke up with the chills.  I have a fever of 101.5 something is clearly going on…

## 2021-12-29 NOTE — TELEPHONE ENCOUNTER
Charu, please review and advise. Thank you. Impression   CONCLUSION:  Subtle bronchial wall thickening suggested, may reflect bronchitis.  No sign of pneumonia.

## 2021-12-29 NOTE — TELEPHONE ENCOUNTER
Discussed with Dr. Sabine Roe. I can treat her bronchitis but Pt should go get covid tested again, especially since she is running a fever. Does Pt want to have the covid test thru us? As for her bronchitis, lets put her on a Z-ketty.  Script sent to her [facility]. Pt m

## 2021-12-30 ENCOUNTER — LAB ENCOUNTER (OUTPATIENT)
Dept: LAB | Age: 51
End: 2021-12-30
Attending: FAMILY MEDICINE
Payer: COMMERCIAL

## 2021-12-30 ENCOUNTER — PATIENT MESSAGE (OUTPATIENT)
Dept: NEUROLOGY | Facility: CLINIC | Age: 51
End: 2021-12-30

## 2021-12-30 ENCOUNTER — TELEPHONE (OUTPATIENT)
Dept: NEUROLOGY | Facility: CLINIC | Age: 51
End: 2021-12-30

## 2021-12-30 DIAGNOSIS — R50.9 FEVER, UNSPECIFIED FEVER CAUSE: ICD-10-CM

## 2021-12-30 NOTE — TELEPHONE ENCOUNTER
FMLA paper work placed in appropriate area of nurse station. Per [employer] message from 12/27:     They also requested that my FMLA be updated to state continuous and intermittent usage because of this episode to prevent requiring a letter in the future

## 2022-01-02 LAB — SARS-COV-2 RNA RESP QL NAA+PROBE: NOT DETECTED

## 2022-01-06 NOTE — TELEPHONE ENCOUNTER
Spoke with patient who states she has 3:40 xavi't with Dr Nehal Powers today. Not on schedule. Patient is due for f/u xavi't in March. Paper work left at  for patient to .

## 2022-01-10 DIAGNOSIS — I10 ESSENTIAL HYPERTENSION: ICD-10-CM

## 2022-01-10 RX ORDER — TRIAMTERENE AND HYDROCHLOROTHIAZIDE 37.5; 25 MG/1; MG/1
CAPSULE ORAL
Qty: 90 CAPSULE | Refills: 0 | Status: SHIPPED | OUTPATIENT
Start: 2022-01-10

## 2022-01-10 NOTE — TELEPHONE ENCOUNTER
triamterne-hydrochlorothiazide 37.5-25 mg   Filled 4-22-21  Qty 90  0 refills  RTC 1-2 months  LOV 10-20-21  No upcoming appt.

## 2022-03-17 ENCOUNTER — PATIENT MESSAGE (OUTPATIENT)
Dept: NEUROLOGY | Facility: CLINIC | Age: 52
End: 2022-03-17

## 2022-03-17 RX ORDER — DIAZEPAM 5 MG/1
TABLET ORAL
Qty: 30 TABLET | Refills: 0 | Status: SHIPPED | OUTPATIENT
Start: 2022-03-17

## 2022-03-17 NOTE — TELEPHONE ENCOUNTER
MRI Pituitary was done on 3/14/22 at [facility]. Report can be viewed through [facility]. Patient requesting Dr. Tammi Villanueva review.

## 2022-03-17 NOTE — TELEPHONE ENCOUNTER
From: Yasmani Prabhakar  To: Sarah Magallanes MD  Sent: 3/17/2022 1:17 PM CDT  Subject: MRI    Good afternoon,    I am seeing the neurosurgeon on Tuesday at 9:15 am. The endocrinologist contacted me regarding my MRI there have been some changes. Please review the MRI I am a little bit nervous about it.

## 2022-03-18 RX ORDER — ONDANSETRON 4 MG/1
TABLET, FILM COATED ORAL
Qty: 30 TABLET | Refills: 0 | Status: SHIPPED | OUTPATIENT
Start: 2022-03-18

## 2022-03-24 NOTE — TELEPHONE ENCOUNTER
[facility] 29 minutes ago (10:02 AM)         We can give her a referral to Dr Amauri Larson for pituitary adenoma. Thanks     Referral placed.  Sent pt message

## 2022-03-26 ENCOUNTER — HOSPITAL ENCOUNTER (OUTPATIENT)
Dept: CV DIAGNOSTICS | Facility: HOSPITAL | Age: 52
Discharge: HOME | End: 2022-03-26
Attending: FAMILY MEDICINE
Payer: COMMERCIAL

## 2022-03-26 DIAGNOSIS — R06.02 SOB (SHORTNESS OF BREATH): ICD-10-CM

## 2022-03-26 PROCEDURE — 93306 TTE W/DOPPLER COMPLETE: CPT | Performed by: FAMILY MEDICINE

## 2022-04-08 ENCOUNTER — TELEPHONE (OUTPATIENT)
Dept: SURGERY | Facility: CLINIC | Age: 52
End: 2022-04-08

## 2022-04-08 RX ORDER — ERENUMAB-AOOE 140 MG/ML
140 INJECTION, SOLUTION SUBCUTANEOUS
Qty: 1 ML | Refills: 2 | Status: SHIPPED | OUTPATIENT
Start: 2022-04-08

## 2022-04-12 ENCOUNTER — PATIENT MESSAGE (OUTPATIENT)
Dept: NEUROLOGY | Facility: CLINIC | Age: 52
End: 2022-04-12

## 2022-04-12 NOTE — TELEPHONE ENCOUNTER
From: Fred Jane  To: Dori Yates MD  Sent: 4/12/2022 4:33 PM CDT  Subject: FMLA Forms    Did you receive my FMLA forms? I dropped them off last week at the .

## 2022-04-14 NOTE — TELEPHONE ENCOUNTER
Patient had indicated she wishes form to be completed for BOTH continuous and intermittent FMLA, to avoid excessive paperwork as had happened last time. She has, however, no dates identified as requiring continuous absence. Advised that continuous disability would require discreet dates. As she is proactively seeking continuous disability, advised to wait and will fill continuous if and when she requires that. She is in agreement with plan. FMLA for intermittent initiated and endorsed to Dr. Jenn Yoo for review, edits as necessary and signature as appropriate.

## 2022-05-02 ENCOUNTER — LAB ENCOUNTER (OUTPATIENT)
Dept: LAB | Age: 52
End: 2022-05-02
Attending: FAMILY MEDICINE
Payer: COMMERCIAL

## 2022-05-02 ENCOUNTER — TELEMEDICINE (OUTPATIENT)
Dept: INTERNAL MEDICINE CLINIC | Facility: CLINIC | Age: 52
End: 2022-05-02
Payer: COMMERCIAL

## 2022-05-02 ENCOUNTER — TELEPHONE (OUTPATIENT)
Dept: INTERNAL MEDICINE CLINIC | Facility: CLINIC | Age: 52
End: 2022-05-02

## 2022-05-02 DIAGNOSIS — B34.9 VIRAL SYNDROME: ICD-10-CM

## 2022-05-02 DIAGNOSIS — B34.9 VIRAL SYNDROME: Primary | ICD-10-CM

## 2022-05-02 NOTE — TELEPHONE ENCOUNTER
Future Appointments   Date Time Provider Minna Juan   5/2/2022 12:00 PM Leydi Ma MD [facility] [place]

## 2022-05-02 NOTE — TELEPHONE ENCOUNTER
Pt called stating two of her co workers tested positive for covid on Saturday and she was last in contact with them on Friday. Pt states right now she has her regular season allergies so she is unsure if they are sxs from covid. Pt has headaches and sinus pressure/pain. No available appts today, TO has a few 15 min appts. [id number] [address] Robertson for covid test order or schedule 15 min VV today ?

## 2022-05-03 ENCOUNTER — PATIENT MESSAGE (OUTPATIENT)
Dept: INTERNAL MEDICINE CLINIC | Facility: CLINIC | Age: 52
End: 2022-05-03

## 2022-05-03 LAB — SARS-COV-2 RNA RESP QL NAA+PROBE: NOT DETECTED

## 2022-05-21 ENCOUNTER — HOSPITAL ENCOUNTER (EMERGENCY)
Age: 52
Discharge: HOME | End: 2022-05-21
Attending: EMERGENCY MEDICINE
Payer: COMMERCIAL

## 2022-05-21 ENCOUNTER — APPOINTMENT (OUTPATIENT)
Dept: GENERAL RADIOLOGY | Age: 52
End: 2022-05-21
Attending: EMERGENCY MEDICINE
Payer: COMMERCIAL

## 2022-05-21 VITALS
BODY MASS INDEX: 41.02 KG/M2 | OXYGEN SATURATION: 98 % | WEIGHT: 293 LBS | SYSTOLIC BLOOD PRESSURE: 131 MMHG | DIASTOLIC BLOOD PRESSURE: 75 MMHG | HEART RATE: 65 BPM | RESPIRATION RATE: 16 BRPM | HEIGHT: 71 IN | TEMPERATURE: 97 F

## 2022-05-21 DIAGNOSIS — R07.9 CHEST PAIN OF UNCERTAIN ETIOLOGY: Primary | ICD-10-CM

## 2022-05-21 LAB
ALBUMIN SERPL-MCNC: 3 G/DL (ref 3.4–5)
ALBUMIN/GLOB SERPL: 0.7 {RATIO} (ref 1–2)
ALP LIVER SERPL-CCNC: 67 U/L
ALT SERPL-CCNC: 19 U/L
ANION GAP SERPL CALC-SCNC: 6 MMOL/L (ref 0–18)
AST SERPL-CCNC: 22 U/L (ref 15–37)
BASOPHILS # BLD AUTO: 0.01 X10(3) UL (ref 0–0.2)
BASOPHILS NFR BLD AUTO: 0.2 %
BILIRUB SERPL-MCNC: 0.3 MG/DL (ref 0.1–2)
BUN BLD-MCNC: 13 MG/DL (ref 7–18)
CALCIUM BLD-MCNC: 9.3 MG/DL (ref 8.5–10.1)
CHLORIDE SERPL-SCNC: 104 MMOL/L (ref 98–112)
CO2 SERPL-SCNC: 28 MMOL/L (ref 21–32)
CREAT BLD-MCNC: 0.9 MG/DL
EOSINOPHIL # BLD AUTO: 0.13 X10(3) UL (ref 0–0.7)
EOSINOPHIL NFR BLD AUTO: 2.8 %
ERYTHROCYTE [DISTWIDTH] IN BLOOD BY AUTOMATED COUNT: 12.7 %
GLOBULIN PLAS-MCNC: 4.5 G/DL (ref 2.8–4.4)
GLUCOSE BLD-MCNC: 94 MG/DL (ref 70–99)
HCT VFR BLD AUTO: 39.1 %
HGB BLD-MCNC: 12.3 G/DL
IMM GRANULOCYTES # BLD AUTO: 0.01 X10(3) UL (ref 0–1)
IMM GRANULOCYTES NFR BLD: 0.2 %
LYMPHOCYTES # BLD AUTO: 2.23 X10(3) UL (ref 1–4)
LYMPHOCYTES NFR BLD AUTO: 48.1 %
MCH RBC QN AUTO: 27.8 PG (ref 26–34)
MCHC RBC AUTO-ENTMCNC: 31.5 G/DL (ref 31–37)
MCV RBC AUTO: 88.3 FL
MONOCYTES # BLD AUTO: 0.37 X10(3) UL (ref 0.1–1)
MONOCYTES NFR BLD AUTO: 8 %
NEUTROPHILS # BLD AUTO: 1.89 X10 (3) UL (ref 1.5–7.7)
NEUTROPHILS # BLD AUTO: 1.89 X10(3) UL (ref 1.5–7.7)
NEUTROPHILS NFR BLD AUTO: 40.7 %
OSMOLALITY SERPL CALC.SUM OF ELEC: 286 MOSM/KG (ref 275–295)
PLATELET # BLD AUTO: 315 10(3)UL (ref 150–450)
POTASSIUM SERPL-SCNC: 3.5 MMOL/L (ref 3.5–5.1)
PROT SERPL-MCNC: 7.5 G/DL (ref 6.4–8.2)
RBC # BLD AUTO: 4.43 X10(6)UL
SODIUM SERPL-SCNC: 138 MMOL/L (ref 136–145)
TROPONIN I HIGH SENSITIVITY: 6 NG/L
TROPONIN I HIGH SENSITIVITY: 6 NG/L
WBC # BLD AUTO: 4.6 X10(3) UL (ref 4–11)

## 2022-05-21 PROCEDURE — 80053 COMPREHEN METABOLIC PANEL: CPT | Performed by: EMERGENCY MEDICINE

## 2022-05-21 PROCEDURE — 99285 EMERGENCY DEPT VISIT HI MDM: CPT

## 2022-05-21 PROCEDURE — 85025 COMPLETE CBC W/AUTO DIFF WBC: CPT | Performed by: EMERGENCY MEDICINE

## 2022-05-21 PROCEDURE — 99284 EMERGENCY DEPT VISIT MOD MDM: CPT

## 2022-05-21 PROCEDURE — 93010 ELECTROCARDIOGRAM REPORT: CPT

## 2022-05-21 PROCEDURE — 84484 ASSAY OF TROPONIN QUANT: CPT | Performed by: EMERGENCY MEDICINE

## 2022-05-21 PROCEDURE — 93005 ELECTROCARDIOGRAM TRACING: CPT

## 2022-05-21 PROCEDURE — 71045 X-RAY EXAM CHEST 1 VIEW: CPT | Performed by: EMERGENCY MEDICINE

## 2022-05-21 PROCEDURE — 36415 COLL VENOUS BLD VENIPUNCTURE: CPT

## 2022-05-22 ENCOUNTER — PATIENT MESSAGE (OUTPATIENT)
Dept: INTERNAL MEDICINE CLINIC | Facility: CLINIC | Age: 52
End: 2022-05-22

## 2022-05-23 LAB
ATRIAL RATE: 73 BPM
P AXIS: -6 DEGREES
P-R INTERVAL: 174 MS
Q-T INTERVAL: 376 MS
QRS DURATION: 86 MS
QTC CALCULATION (BEZET): 414 MS
R AXIS: 9 DEGREES
T AXIS: -14 DEGREES
VENTRICULAR RATE: 73 BPM

## 2022-05-23 NOTE — TELEPHONE ENCOUNTER
From: Dianne Espinal  To: David Poster, APRN  Sent: 5/22/2022 4:57 PM CDT  Subject: ER Follow Up    Good morning,    I was seen on the ER on Saturday for chest pain. I was told to follow up with my primary doctor on Monday and have a stress test on Monday.

## 2022-05-24 ENCOUNTER — ORDER TRANSCRIPTION (OUTPATIENT)
Dept: ADMINISTRATIVE | Facility: HOSPITAL | Age: 52
End: 2022-05-24

## 2022-05-24 DIAGNOSIS — Z11.59 ENCOUNTER FOR SCREENING FOR OTHER VIRAL DISEASES: ICD-10-CM

## 2022-05-24 DIAGNOSIS — Z01.818 PREOP EXAMINATION: Primary | ICD-10-CM

## 2022-05-26 ENCOUNTER — OFFICE VISIT (OUTPATIENT)
Dept: INTERNAL MEDICINE CLINIC | Facility: CLINIC | Age: 52
End: 2022-05-26
Payer: COMMERCIAL

## 2022-05-26 ENCOUNTER — OFFICE VISIT (OUTPATIENT)
Dept: SURGERY | Facility: CLINIC | Age: 52
End: 2022-05-26
Payer: COMMERCIAL

## 2022-05-26 VITALS
HEART RATE: 84 BPM | HEIGHT: 71 IN | RESPIRATION RATE: 18 BRPM | SYSTOLIC BLOOD PRESSURE: 110 MMHG | WEIGHT: 293 LBS | BODY MASS INDEX: 41.02 KG/M2 | OXYGEN SATURATION: 97 % | TEMPERATURE: 98 F | DIASTOLIC BLOOD PRESSURE: 80 MMHG

## 2022-05-26 VITALS — HEART RATE: 80 BPM | DIASTOLIC BLOOD PRESSURE: 80 MMHG | SYSTOLIC BLOOD PRESSURE: 120 MMHG

## 2022-05-26 DIAGNOSIS — R07.9 CHEST PAIN, UNSPECIFIED TYPE: Primary | ICD-10-CM

## 2022-05-26 DIAGNOSIS — D49.7 PITUITARY TUMOR: Primary | ICD-10-CM

## 2022-05-26 DIAGNOSIS — E66.01 MORBID OBESITY (HCC): ICD-10-CM

## 2022-05-26 DIAGNOSIS — R53.83 FATIGUE, UNSPECIFIED TYPE: ICD-10-CM

## 2022-05-26 PROCEDURE — 3008F BODY MASS INDEX DOCD: CPT | Performed by: FAMILY MEDICINE

## 2022-05-26 PROCEDURE — 3079F DIAST BP 80-89 MM HG: CPT | Performed by: FAMILY MEDICINE

## 2022-05-26 PROCEDURE — 99213 OFFICE O/P EST LOW 20 MIN: CPT | Performed by: NEUROLOGICAL SURGERY

## 2022-05-26 PROCEDURE — 3074F SYST BP LT 130 MM HG: CPT | Performed by: NEUROLOGICAL SURGERY

## 2022-05-26 PROCEDURE — 3079F DIAST BP 80-89 MM HG: CPT | Performed by: NEUROLOGICAL SURGERY

## 2022-05-26 PROCEDURE — 3074F SYST BP LT 130 MM HG: CPT | Performed by: FAMILY MEDICINE

## 2022-05-26 PROCEDURE — 99214 OFFICE O/P EST MOD 30 MIN: CPT | Performed by: FAMILY MEDICINE

## 2022-05-26 RX ORDER — HYDROCHLOROTHIAZIDE 25 MG/1
TABLET ORAL
COMMUNITY

## 2022-05-26 NOTE — PROGRESS NOTES
reports she does not feel well in general; she reports headaches and vertigo and has shortness of breath with her migraines. States she has been seeing spots. Her balance is off when she get migraines.

## 2022-06-01 ENCOUNTER — TELEPHONE (OUTPATIENT)
Dept: NEUROLOGY | Facility: CLINIC | Age: 52
End: 2022-06-01

## 2022-06-01 NOTE — TELEPHONE ENCOUNTER
Insurance is requiring clinicals for PA. I need to fax these ASAP    Please have physician/provider complete the LOV note from 05/26/2022 so prior authorization can be completed for the MRI PITUITARY (W+WO) (CPT=70553)  that was requested.   Insurance requires current clinical note(s) at time of prior authorization request. Thank you

## 2022-06-02 DIAGNOSIS — E78.00 PURE HYPERCHOLESTEROLEMIA: ICD-10-CM

## 2022-06-02 RX ORDER — ROSUVASTATIN CALCIUM 10 MG/1
10 TABLET, COATED ORAL DAILY
Qty: 90 TABLET | Refills: 0 | Status: SHIPPED | OUTPATIENT
Start: 2022-06-02 | End: 2023-11-24

## 2022-06-02 RX ORDER — ERGOCALCIFEROL 1.25 MG/1
50000 CAPSULE ORAL WEEKLY
Qty: 12 CAPSULE | Refills: 1 | OUTPATIENT
Start: 2022-06-02

## 2022-06-03 RX ORDER — TOPIRAMATE 100 MG/1
TABLET, FILM COATED ORAL
Qty: 45 TABLET | Refills: 5 | OUTPATIENT
Start: 2022-06-03

## 2022-06-04 ENCOUNTER — HOSPITAL ENCOUNTER (OUTPATIENT)
Dept: CV DIAGNOSTICS | Facility: HOSPITAL | Age: 52
Discharge: HOME | End: 2022-06-04
Attending: INTERNAL MEDICINE
Payer: COMMERCIAL

## 2022-06-04 DIAGNOSIS — R07.89 CHEST DISCOMFORT: ICD-10-CM

## 2022-06-06 RX ORDER — ERGOCALCIFEROL 1.25 MG/1
CAPSULE ORAL
Qty: 12 CAPSULE | Refills: 1 | OUTPATIENT
Start: 2022-06-06

## 2022-06-10 ENCOUNTER — TELEPHONE (OUTPATIENT)
Dept: INTERNAL MEDICINE CLINIC | Facility: CLINIC | Age: 52
End: 2022-06-10

## 2022-06-10 ENCOUNTER — LAB ENCOUNTER (OUTPATIENT)
Dept: LAB | Age: 52
End: 2022-06-10
Attending: FAMILY MEDICINE
Payer: COMMERCIAL

## 2022-06-10 DIAGNOSIS — E55.9 VITAMIN D DEFICIENCY: ICD-10-CM

## 2022-06-10 DIAGNOSIS — R73.03 PREDIABETES: ICD-10-CM

## 2022-06-10 DIAGNOSIS — R79.9 ELEVATED BUN: ICD-10-CM

## 2022-06-10 LAB
ALBUMIN SERPL-MCNC: 3.2 G/DL (ref 3.4–5)
ALBUMIN/GLOB SERPL: 0.8 {RATIO} (ref 1–2)
ALP LIVER SERPL-CCNC: 61 U/L
ALT SERPL-CCNC: 17 U/L
ANION GAP SERPL CALC-SCNC: 5 MMOL/L (ref 0–18)
AST SERPL-CCNC: 14 U/L (ref 15–37)
BILIRUB SERPL-MCNC: 0.4 MG/DL (ref 0.1–2)
BUN BLD-MCNC: 17 MG/DL (ref 7–18)
CALCIUM BLD-MCNC: 9.7 MG/DL (ref 8.5–10.1)
CHLORIDE SERPL-SCNC: 106 MMOL/L (ref 98–112)
CHOLEST SERPL-MCNC: 241 MG/DL (ref ?–200)
CO2 SERPL-SCNC: 27 MMOL/L (ref 21–32)
CREAT BLD-MCNC: 0.92 MG/DL
EST. AVERAGE GLUCOSE BLD GHB EST-MCNC: 126 MG/DL (ref 68–126)
FASTING PATIENT LIPID ANSWER: YES
FASTING STATUS PATIENT QL REPORTED: YES
GLOBULIN PLAS-MCNC: 4 G/DL (ref 2.8–4.4)
GLUCOSE BLD-MCNC: 95 MG/DL (ref 70–99)
HBA1C MFR BLD: 6 % (ref ?–5.7)
HDLC SERPL-MCNC: 50 MG/DL (ref 40–59)
LDLC SERPL CALC-MCNC: 173 MG/DL (ref ?–100)
NONHDLC SERPL-MCNC: 191 MG/DL (ref ?–130)
OSMOLALITY SERPL CALC.SUM OF ELEC: 287 MOSM/KG (ref 275–295)
POTASSIUM SERPL-SCNC: 3.8 MMOL/L (ref 3.5–5.1)
PROT SERPL-MCNC: 7.2 G/DL (ref 6.4–8.2)
SODIUM SERPL-SCNC: 138 MMOL/L (ref 136–145)
TRIGL SERPL-MCNC: 100 MG/DL (ref 30–149)
VIT D+METAB SERPL-MCNC: 45.8 NG/ML (ref 30–100)
VLDLC SERPL CALC-MCNC: 20 MG/DL (ref 0–30)

## 2022-06-10 PROCEDURE — 80053 COMPREHEN METABOLIC PANEL: CPT

## 2022-06-10 PROCEDURE — 80061 LIPID PANEL: CPT | Performed by: FAMILY MEDICINE

## 2022-06-10 PROCEDURE — 83036 HEMOGLOBIN GLYCOSYLATED A1C: CPT

## 2022-06-10 PROCEDURE — 82306 VITAMIN D 25 HYDROXY: CPT

## 2022-06-10 PROCEDURE — 36415 COLL VENOUS BLD VENIPUNCTURE: CPT | Performed by: FAMILY MEDICINE

## 2022-06-10 NOTE — TELEPHONE ENCOUNTER
Pt came into the office stating SM had recommended a GI for her colonoscopy. Pt does not remember who she was rec to see. Pt looking for recs on a GI she can see for a colonoscopy.

## 2022-06-12 DIAGNOSIS — I10 ESSENTIAL HYPERTENSION: ICD-10-CM

## 2022-06-13 RX ORDER — TRIAMTERENE AND HYDROCHLOROTHIAZIDE 37.5; 25 MG/1; MG/1
CAPSULE ORAL
Qty: 90 CAPSULE | Refills: 0 | Status: SHIPPED | OUTPATIENT
Start: 2022-06-13

## 2022-06-14 ENCOUNTER — TELEPHONE (OUTPATIENT)
Dept: INTERNAL MEDICINE CLINIC | Facility: CLINIC | Age: 52
End: 2022-06-14

## 2022-06-14 DIAGNOSIS — E55.9 VITAMIN D DEFICIENCY: Primary | ICD-10-CM

## 2022-06-14 DIAGNOSIS — E78.00 PURE HYPERCHOLESTEROLEMIA: Primary | ICD-10-CM

## 2022-06-14 NOTE — TELEPHONE ENCOUNTER
----- Message from LUIS Toledo sent at 6/11/2022  3:49 PM CDT -----  Hgba1c up ,please watch the carbs in the diet. Vit D level normal now, Pt to take at least 2000 -3000 IU daily of otc Vit D to maintain the level. Recheck the Vit D level again in 6 months to make sure Pt is maintaining the level.   CMP wnl

## 2022-06-14 NOTE — TELEPHONE ENCOUNTER
----- Message from LUIS Cox sent at 6/11/2022  3:52 PM CDT -----  Chol, Ldl and non Hdl all coming down slowly. Please continue crestor and eat a low fat diet. Recheck in 6 months.

## 2022-06-19 ENCOUNTER — APPOINTMENT (OUTPATIENT)
Dept: GENERAL RADIOLOGY | Facility: HOSPITAL | Age: 52
End: 2022-06-19
Attending: EMERGENCY MEDICINE
Payer: COMMERCIAL

## 2022-06-19 ENCOUNTER — LAB ENCOUNTER (OUTPATIENT)
Dept: LAB | Facility: HOSPITAL | Age: 52
End: 2022-06-19
Attending: INTERNAL MEDICINE
Payer: COMMERCIAL

## 2022-06-19 ENCOUNTER — HOSPITAL ENCOUNTER (EMERGENCY)
Facility: HOSPITAL | Age: 52
Discharge: HOME | End: 2022-06-19
Attending: EMERGENCY MEDICINE
Payer: COMMERCIAL

## 2022-06-19 VITALS
OXYGEN SATURATION: 97 % | HEIGHT: 71 IN | TEMPERATURE: 97 F | HEART RATE: 75 BPM | WEIGHT: 293 LBS | BODY MASS INDEX: 41.02 KG/M2 | SYSTOLIC BLOOD PRESSURE: 102 MMHG | DIASTOLIC BLOOD PRESSURE: 66 MMHG | RESPIRATION RATE: 17 BRPM

## 2022-06-19 DIAGNOSIS — Z01.818 PREOP EXAMINATION: ICD-10-CM

## 2022-06-19 DIAGNOSIS — S83.92XA SPRAIN OF LEFT KNEE, UNSPECIFIED LIGAMENT, INITIAL ENCOUNTER: Primary | ICD-10-CM

## 2022-06-19 DIAGNOSIS — Z11.59 ENCOUNTER FOR SCREENING FOR OTHER VIRAL DISEASES: ICD-10-CM

## 2022-06-19 PROCEDURE — 73562 X-RAY EXAM OF KNEE 3: CPT | Performed by: EMERGENCY MEDICINE

## 2022-06-19 PROCEDURE — 99283 EMERGENCY DEPT VISIT LOW MDM: CPT

## 2022-06-19 NOTE — ED INITIAL ASSESSMENT (HPI)
Patient in stating about 4 days ago she was getting out of car and felt \"pop\" on L knee. C/o swelling and pain. Unable to see in TL pt wearing jeans.  Patient ambulatory in TL

## 2022-06-20 LAB — SARS-COV-2 RNA RESP QL NAA+PROBE: NOT DETECTED

## 2022-06-22 ENCOUNTER — HOSPITAL ENCOUNTER (OUTPATIENT)
Dept: MRI IMAGING | Facility: HOSPITAL | Age: 52
Discharge: HOME | End: 2022-06-22
Attending: NEUROLOGICAL SURGERY
Payer: COMMERCIAL

## 2022-06-22 ENCOUNTER — APPOINTMENT (OUTPATIENT)
Dept: CV DIAGNOSTICS | Facility: HOSPITAL | Age: 52
End: 2022-06-22
Attending: INTERNAL MEDICINE
Payer: COMMERCIAL

## 2022-06-22 ENCOUNTER — HOSPITAL ENCOUNTER (OUTPATIENT)
Dept: CV DIAGNOSTICS | Facility: HOSPITAL | Age: 52
Discharge: HOME | End: 2022-06-22
Attending: INTERNAL MEDICINE
Payer: COMMERCIAL

## 2022-06-22 ENCOUNTER — TELEPHONE (OUTPATIENT)
Dept: ADMINISTRATIVE | Age: 52
End: 2022-06-22

## 2022-06-22 DIAGNOSIS — Z98.890 S/P RESECTION OF MENINGIOMA: ICD-10-CM

## 2022-06-22 DIAGNOSIS — G43.009 MIGRAINE WITHOUT AURA AND WITHOUT STATUS MIGRAINOSUS, NOT INTRACTABLE: ICD-10-CM

## 2022-06-22 DIAGNOSIS — Z86.018 S/P RESECTION OF MENINGIOMA: ICD-10-CM

## 2022-06-22 DIAGNOSIS — F41.9 ANXIETY: ICD-10-CM

## 2022-06-22 DIAGNOSIS — D49.7 PITUITARY TUMOR: ICD-10-CM

## 2022-06-22 DIAGNOSIS — R07.89 CHEST DISCOMFORT: ICD-10-CM

## 2022-06-22 PROCEDURE — 93018 CV STRESS TEST I&R ONLY: CPT | Performed by: INTERNAL MEDICINE

## 2022-06-22 PROCEDURE — 93350 STRESS TTE ONLY: CPT | Performed by: INTERNAL MEDICINE

## 2022-06-22 PROCEDURE — A9575 INJ GADOTERATE MEGLUMI 0.1ML: HCPCS | Performed by: NEUROLOGICAL SURGERY

## 2022-06-22 PROCEDURE — 93306 TTE W/DOPPLER COMPLETE: CPT | Performed by: INTERNAL MEDICINE

## 2022-06-22 PROCEDURE — 70553 MRI BRAIN STEM W/O & W/DYE: CPT | Performed by: NEUROLOGICAL SURGERY

## 2022-06-22 PROCEDURE — 93017 CV STRESS TEST TRACING ONLY: CPT | Performed by: INTERNAL MEDICINE

## 2022-06-22 RX ORDER — DIAZEPAM 5 MG/1
5 TABLET ORAL NIGHTLY PRN
Qty: 30 TABLET | Refills: 0 | Status: SHIPPED | OUTPATIENT
Start: 2022-06-22

## 2022-06-28 ENCOUNTER — TELEPHONE (OUTPATIENT)
Dept: SURGERY | Facility: CLINIC | Age: 52
End: 2022-06-28

## 2022-06-28 NOTE — TELEPHONE ENCOUNTER
Discussed with Dr. Mike Mendez. Reviewed imaging at Neuro Conference. Per Dr. Mike Mendez:    Imaging looks stable. Dr. Mike Mendez states whatever Dr. Mann Sandoval recommends for continued follow up/monitoring he agrees with.      The patient may follow up PRN    Please advise, thank you
[employer] message sent.
21-May-2017

## 2022-07-08 DIAGNOSIS — G43.009 MIGRAINE WITHOUT AURA AND WITHOUT STATUS MIGRAINOSUS, NOT INTRACTABLE: ICD-10-CM

## 2022-07-08 RX ORDER — ERENUMAB-AOOE 140 MG/ML
140 INJECTION, SOLUTION SUBCUTANEOUS
Qty: 1 ML | Refills: 2 | Status: SHIPPED | OUTPATIENT
Start: 2022-07-08

## 2022-07-15 ENCOUNTER — LAB ENCOUNTER (OUTPATIENT)
Dept: LAB | Age: 52
End: 2022-07-15
Attending: FAMILY MEDICINE
Payer: COMMERCIAL

## 2022-07-15 PROCEDURE — 85730 THROMBOPLASTIN TIME PARTIAL: CPT | Performed by: FAMILY MEDICINE

## 2022-07-15 PROCEDURE — 36415 COLL VENOUS BLD VENIPUNCTURE: CPT | Performed by: FAMILY MEDICINE

## 2022-07-15 PROCEDURE — 85025 COMPLETE CBC W/AUTO DIFF WBC: CPT | Performed by: FAMILY MEDICINE

## 2022-07-15 PROCEDURE — 85610 PROTHROMBIN TIME: CPT | Performed by: FAMILY MEDICINE

## 2022-07-19 NOTE — PROGRESS NOTES
[facility]    Lo Spears  5/26/2022    Diagnosis: Pituitary prolactin secreting adenoma. 2015 [place] left parietal meningioma resection. Chief complaint: Here for second opinion on imaging change in her pituitary adenoma. Interval History: Patient is here to discuss the change in her imaging studies of her pituitary adenoma followed by Dr. Gila Payan. She is on long-term cabergoline for an old microadenoma which recently showed some new hyperenhancement. Dr. Gila Payan suggested watching this conservatively. Her dose of cabergoline is 0.5 mg twice weekly. She started this in December 2021. She has a history of migraines for many years. These have affected her vision. Interval Examination: She is awake alert and oriented. Cranial nerves II through XII are intact. She has nose visual disturbance to finger counting in the periphery. Imaging: She had multiple MRIs including a 5/26/2022 scan a 7/31/2020 scan a 9/23/2016 scan 9/14/2017 scan. Labs: No new labs. Assessment: Her scans were eventually reviewed at neuro-oncology conference. Given the difference in technique she is in the scans there is no area of concern on her current MRI. Plan: Follow-up as needed with Dr. Gila Payan. Total face to face time was 15 minutes, more than 50% of the time was spent in counseling and/or coordination of care related to review of scans and eventual review at her work. Michel Storm.  Abner Quintanilla, [address] [facility]  Neurological Surgery    Co-Director  [place] [address] [place]

## 2022-07-21 ENCOUNTER — PATIENT MESSAGE (OUTPATIENT)
Dept: NEUROLOGY | Facility: CLINIC | Age: 52
End: 2022-07-21

## 2022-07-22 RX ORDER — METHYLPREDNISOLONE 4 MG/1
TABLET ORAL
Qty: 1 EACH | Refills: 0 | Status: SHIPPED | OUTPATIENT
Start: 2022-07-22

## 2022-07-22 NOTE — TELEPHONE ENCOUNTER
Patient states her headache is improving, and requests a medrol dosepak as she is not able to present to office in time for injection. Medication pended for MD approval if appropriate.

## 2022-07-22 NOTE — TELEPHONE ENCOUNTER
TCB. Received message from Dr. Frances Sanchez: Amor Gupta for the T?D injections. Thank. Due to provider schedule, calling to see if patient able to come to office prior to providers leaving. Alternately, would oral medrol dosepak be an option, or would patient be willing/able to present to urgent care to receive toradol/decadron injection?

## 2022-07-23 ENCOUNTER — PATIENT MESSAGE (OUTPATIENT)
Dept: NEUROLOGY | Facility: CLINIC | Age: 52
End: 2022-07-23

## 2022-07-23 RX ORDER — ERGOCALCIFEROL 1.25 MG/1
CAPSULE ORAL
Qty: 12 CAPSULE | Refills: 1 | Status: SHIPPED | OUTPATIENT
Start: 2022-07-23

## 2022-07-25 NOTE — TELEPHONE ENCOUNTER
From: Fred Jane  Sent: 7/23/2022 10:05 AM CDT  To: Bill Aponte Nurse  Subject: Medicine     I know yesterday it was to late for me to get the injection due to doctors leaving for day. I so need it this morning. If things do not turn around I am going to the urgent care.

## 2022-08-08 ENCOUNTER — PATIENT MESSAGE (OUTPATIENT)
Dept: NEUROLOGY | Facility: CLINIC | Age: 52
End: 2022-08-08

## 2022-08-08 DIAGNOSIS — G43.009 MIGRAINE WITHOUT AURA AND WITHOUT STATUS MIGRAINOSUS, NOT INTRACTABLE: Primary | ICD-10-CM

## 2022-08-08 NOTE — TELEPHONE ENCOUNTER
From: Sydnee Mckeon  To: Siva Zepeda MD  Sent: 8/8/2022 7:04 AM CDT  Subject: Nurse Visit    I am in need of injection for my migraine.

## 2022-08-09 ENCOUNTER — HOSPITAL ENCOUNTER (EMERGENCY)
Age: 52
Discharge: HOME | End: 2022-08-09
Attending: EMERGENCY MEDICINE
Payer: COMMERCIAL

## 2022-08-09 VITALS
OXYGEN SATURATION: 100 % | WEIGHT: 293 LBS | SYSTOLIC BLOOD PRESSURE: 107 MMHG | HEART RATE: 65 BPM | HEIGHT: 71 IN | DIASTOLIC BLOOD PRESSURE: 51 MMHG | RESPIRATION RATE: 18 BRPM | TEMPERATURE: 98 F | BODY MASS INDEX: 41.02 KG/M2

## 2022-08-09 DIAGNOSIS — R51.9 RECURRENT HEADACHE: Primary | ICD-10-CM

## 2022-08-09 PROCEDURE — 96375 TX/PRO/DX INJ NEW DRUG ADDON: CPT

## 2022-08-09 PROCEDURE — 99284 EMERGENCY DEPT VISIT MOD MDM: CPT

## 2022-08-09 PROCEDURE — 96361 HYDRATE IV INFUSION ADD-ON: CPT

## 2022-08-09 PROCEDURE — 96374 THER/PROPH/DIAG INJ IV PUSH: CPT

## 2022-08-09 RX ORDER — METOCLOPRAMIDE HYDROCHLORIDE 5 MG/ML
10 INJECTION INTRAMUSCULAR; INTRAVENOUS ONCE
Status: COMPLETED | OUTPATIENT
Start: 2022-08-09 | End: 2022-08-09

## 2022-08-09 RX ORDER — DIPHENHYDRAMINE HYDROCHLORIDE 50 MG/ML
25 INJECTION INTRAMUSCULAR; INTRAVENOUS ONCE
Status: COMPLETED | OUTPATIENT
Start: 2022-08-09 | End: 2022-08-09

## 2022-08-09 RX ORDER — KETOROLAC TROMETHAMINE 30 MG/ML
30 INJECTION, SOLUTION INTRAMUSCULAR; INTRAVENOUS ONCE
Status: COMPLETED | OUTPATIENT
Start: 2022-08-09 | End: 2022-08-09

## 2022-08-09 NOTE — TELEPHONE ENCOUNTER
Spoke with patient and informed her that Dr Ramila Higuera said she could come in for Toradol/Decadron injection today. Patient  states she is currently in Urgent Care. Apology given to patient that she did not receive response yesterday.

## 2022-08-09 NOTE — ED INITIAL ASSESSMENT (HPI)
C/o migraine headache since the weekend. Light- sensitive. No slurred speech or numbness. No vision changes, nausea.

## 2022-08-11 ENCOUNTER — TELEPHONE (OUTPATIENT)
Dept: NEUROLOGY | Facility: CLINIC | Age: 52
End: 2022-08-11

## 2022-08-11 RX ORDER — RIZATRIPTAN BENZOATE 10 MG/1
10 TABLET, ORALLY DISINTEGRATING ORAL AS NEEDED
Qty: 10 TABLET | Refills: 2 | Status: SHIPPED | OUTPATIENT
Start: 2022-08-11

## 2022-08-11 RX ORDER — FREMANEZUMAB-VFRM 225 MG/1.5ML
225 INJECTION SUBCUTANEOUS
Qty: 1.5 ML | Refills: 0 | Status: SHIPPED | OUTPATIENT
Start: 2022-08-11 | End: 2022-08-15

## 2022-08-11 NOTE — TELEPHONE ENCOUNTER
Call the patient and briefly discuss the MRI results. Stable pituitary lesions. Saw Dr Amauri Larson and he felt there is no area of concern on her current MRI    Getting more migraines, Aimovig not working. Just finished MDP and went to urgent care and was up all night again    [address]

## 2022-08-12 ENCOUNTER — TELEPHONE (OUTPATIENT)
Dept: NEUROLOGY | Facility: CLINIC | Age: 52
End: 2022-08-12

## 2022-08-12 DIAGNOSIS — G43.009 MIGRAINE WITHOUT AURA AND WITHOUT STATUS MIGRAINOSUS, NOT INTRACTABLE: Primary | ICD-10-CM

## 2022-08-15 RX ORDER — FREMANEZUMAB-VFRM 225 MG/1.5ML
225 INJECTION SUBCUTANEOUS
Qty: 1 EACH | Refills: 11 | COMMUNITY
Start: 2022-08-15

## 2022-08-15 NOTE — TELEPHONE ENCOUNTER
AJOVY [employer] form signed by provider and faxed with confirmation. Updated RX historically and copy sent to scanning.

## 2022-08-17 ENCOUNTER — TELEPHONE (OUTPATIENT)
Dept: NEUROLOGY | Facility: CLINIC | Age: 52
End: 2022-08-17

## 2022-08-17 DIAGNOSIS — G43.009 MIGRAINE WITHOUT AURA AND WITHOUT STATUS MIGRAINOSUS, NOT INTRACTABLE: ICD-10-CM

## 2022-08-17 RX ORDER — FREMANEZUMAB-VFRM 225 MG/1.5ML
225 INJECTION SUBCUTANEOUS
Qty: 1 EACH | Refills: 11 | Status: SHIPPED | OUTPATIENT
Start: 2022-08-17

## 2022-08-17 NOTE — TELEPHONE ENCOUNTER
Jayy Becerra with [employer] is calling on behalf of the pt stating they rec'd script for the Ajovi but it did not have the dosage information listed. Jayy Becerra is asking if the dosage information can be faxed over to [phone].  Jayy Becerra states it's ok to use the same form that was used previously just Unga the dosage and resend

## 2022-08-25 ENCOUNTER — HOSPITAL ENCOUNTER (OUTPATIENT)
Age: 52
Discharge: HOME | End: 2022-08-25
Payer: COMMERCIAL

## 2022-08-25 ENCOUNTER — APPOINTMENT (OUTPATIENT)
Dept: CT IMAGING | Age: 52
End: 2022-08-25
Attending: NURSE PRACTITIONER
Payer: COMMERCIAL

## 2022-08-25 VITALS
WEIGHT: 293 LBS | HEIGHT: 71 IN | SYSTOLIC BLOOD PRESSURE: 112 MMHG | BODY MASS INDEX: 41.02 KG/M2 | RESPIRATION RATE: 17 BRPM | TEMPERATURE: 97 F | HEART RATE: 82 BPM | OXYGEN SATURATION: 100 % | DIASTOLIC BLOOD PRESSURE: 72 MMHG

## 2022-08-25 DIAGNOSIS — A08.4 VIRAL GASTROENTERITIS: Primary | ICD-10-CM

## 2022-08-25 LAB
#MXD IC: 0.3 X10ˆ3/UL (ref 0.1–1)
BUN BLD-MCNC: 13 MG/DL (ref 7–18)
CHLORIDE BLD-SCNC: 104 MMOL/L (ref 98–112)
CO2 BLD-SCNC: 26 MMOL/L (ref 21–32)
CREAT BLD-MCNC: 1 MG/DL
GFR SERPLBLD BASED ON 1.73 SQ M-ARVRAT: 69 ML/MIN/1.73M2 (ref 60–?)
GLUCOSE BLD-MCNC: 92 MG/DL (ref 70–99)
HCT VFR BLD AUTO: 40.5 %
HCT VFR BLD CALC: 40 %
HGB BLD-MCNC: 12.3 G/DL
ISTAT IONIZED CALCIUM FOR CHEM 8: 1.24 MMOL/L (ref 1.12–1.32)
LYMPHOCYTES # BLD AUTO: 2 X10ˆ3/UL (ref 1–4)
LYMPHOCYTES NFR BLD AUTO: 48.6 %
MCH RBC QN AUTO: 27.4 PG (ref 26–34)
MCHC RBC AUTO-ENTMCNC: 30.4 G/DL (ref 31–37)
MCV RBC AUTO: 90.2 FL (ref 80–100)
MIXED CELL %: 7.6 %
NEUTROPHILS # BLD AUTO: 1.9 X10ˆ3/UL (ref 1.5–7.7)
NEUTROPHILS NFR BLD AUTO: 43.8 %
PLATELET # BLD AUTO: 290 X10ˆ3/UL (ref 150–450)
POCT BILIRUBIN URINE: NEGATIVE
POCT BLOOD URINE: NEGATIVE
POCT GLUCOSE URINE: NEGATIVE MG/DL
POCT KETONE URINE: NEGATIVE MG/DL
POCT LEUKOCYTE ESTERASE URINE: NEGATIVE
POCT NITRITE URINE: NEGATIVE
POCT PH URINE: 6 (ref 5–8)
POCT PROTEIN URINE: NEGATIVE MG/DL
POCT SPECIFIC GRAVITY URINE: 1.02
POCT URINE CLARITY: CLEAR
POCT URINE COLOR: YELLOW
POCT UROBILINOGEN URINE: 0.2 MG/DL
POTASSIUM BLD-SCNC: 4.5 MMOL/L (ref 3.6–5.1)
RBC # BLD AUTO: 4.49 X10ˆ6/UL
SARS-COV-2 RNA RESP QL NAA+PROBE: NOT DETECTED
SODIUM BLD-SCNC: 138 MMOL/L (ref 136–145)
WBC # BLD AUTO: 4.2 X10ˆ3/UL (ref 4–11)

## 2022-08-25 PROCEDURE — 81002 URINALYSIS NONAUTO W/O SCOPE: CPT | Performed by: NURSE PRACTITIONER

## 2022-08-25 PROCEDURE — 99215 OFFICE O/P EST HI 40 MIN: CPT

## 2022-08-25 PROCEDURE — 80047 BASIC METABLC PNL IONIZED CA: CPT

## 2022-08-25 PROCEDURE — 74177 CT ABD & PELVIS W/CONTRAST: CPT | Performed by: NURSE PRACTITIONER

## 2022-08-25 PROCEDURE — 36415 COLL VENOUS BLD VENIPUNCTURE: CPT

## 2022-08-25 PROCEDURE — 85025 COMPLETE CBC W/AUTO DIFF WBC: CPT | Performed by: NURSE PRACTITIONER

## 2022-08-25 PROCEDURE — 99213 OFFICE O/P EST LOW 20 MIN: CPT

## 2022-08-25 RX ORDER — DICYCLOMINE HCL 20 MG
20 TABLET ORAL 4 TIMES DAILY PRN
Qty: 30 TABLET | Refills: 0 | Status: SHIPPED | OUTPATIENT
Start: 2022-08-25 | End: 2022-09-24

## 2022-08-25 NOTE — ED INITIAL ASSESSMENT (HPI)
Pt began 1 week ago since she began  With abdominal discomfort and diarrhea, pt then developed chills, no fever, no vomiting but is nauseated, and fatigued

## 2022-09-01 ENCOUNTER — PATIENT MESSAGE (OUTPATIENT)
Dept: INTERNAL MEDICINE CLINIC | Facility: CLINIC | Age: 52
End: 2022-09-01

## 2022-09-02 ENCOUNTER — OFFICE VISIT (OUTPATIENT)
Dept: SLEEP CENTER | Age: 52
End: 2022-09-02
Attending: FAMILY MEDICINE
Payer: COMMERCIAL

## 2022-09-02 DIAGNOSIS — R53.83 FATIGUE, UNSPECIFIED TYPE: ICD-10-CM

## 2022-09-02 DIAGNOSIS — E66.01 MORBID OBESITY (HCC): ICD-10-CM

## 2022-09-02 PROCEDURE — 95810 POLYSOM 6/> YRS 4/> PARAM: CPT

## 2022-09-02 NOTE — TELEPHONE ENCOUNTER
From: Vania Simon  To: LUIS Petit  Sent: 9/1/2022 8:50 PM CDT  Subject: Appointment     I am requesting to move my appointment up to see [facility]. I seen the neurosurgeon today and I am very concerned about my health.

## 2022-09-06 ENCOUNTER — LAB ENCOUNTER (OUTPATIENT)
Dept: LAB | Age: 52
End: 2022-09-06
Attending: FAMILY MEDICINE
Payer: COMMERCIAL

## 2022-09-06 ENCOUNTER — OFFICE VISIT (OUTPATIENT)
Dept: INTERNAL MEDICINE CLINIC | Facility: CLINIC | Age: 52
End: 2022-09-06
Payer: COMMERCIAL

## 2022-09-06 ENCOUNTER — TELEPHONE (OUTPATIENT)
Dept: INTERNAL MEDICINE CLINIC | Facility: CLINIC | Age: 52
End: 2022-09-06

## 2022-09-06 VITALS
TEMPERATURE: 98 F | OXYGEN SATURATION: 97 % | HEART RATE: 90 BPM | RESPIRATION RATE: 16 BRPM | BODY MASS INDEX: 41.02 KG/M2 | DIASTOLIC BLOOD PRESSURE: 74 MMHG | HEIGHT: 71 IN | WEIGHT: 293 LBS | SYSTOLIC BLOOD PRESSURE: 118 MMHG

## 2022-09-06 DIAGNOSIS — D35.2 BENIGN NEOPLASM OF PITUITARY GLAND AND CRANIOPHARYNGEAL DUCT (POUCH) (HCC): ICD-10-CM

## 2022-09-06 DIAGNOSIS — Z12.11 ENCOUNTER FOR SCREENING COLONOSCOPY: ICD-10-CM

## 2022-09-06 DIAGNOSIS — D35.3 BENIGN NEOPLASM OF PITUITARY GLAND AND CRANIOPHARYNGEAL DUCT (POUCH) (HCC): ICD-10-CM

## 2022-09-06 DIAGNOSIS — R53.83 FATIGUE, UNSPECIFIED TYPE: ICD-10-CM

## 2022-09-06 DIAGNOSIS — Z00.00 ROUTINE GENERAL MEDICAL EXAMINATION AT A HEALTH CARE FACILITY: Primary | ICD-10-CM

## 2022-09-06 DIAGNOSIS — Z23 NEED FOR SHINGLES VACCINE: ICD-10-CM

## 2022-09-06 DIAGNOSIS — R53.83 FATIGUE, UNSPECIFIED TYPE: Primary | ICD-10-CM

## 2022-09-06 DIAGNOSIS — Z23 NEED FOR TDAP VACCINATION: ICD-10-CM

## 2022-09-06 DIAGNOSIS — I10 ESSENTIAL HYPERTENSION: ICD-10-CM

## 2022-09-06 DIAGNOSIS — Z12.31 ENCOUNTER FOR SCREENING MAMMOGRAM FOR MALIGNANT NEOPLASM OF BREAST: ICD-10-CM

## 2022-09-06 LAB
BASOPHILS # BLD AUTO: 0.01 X10(3) UL (ref 0–0.2)
BASOPHILS NFR BLD AUTO: 0.2 %
EOSINOPHIL # BLD AUTO: 0.07 X10(3) UL (ref 0–0.7)
EOSINOPHIL NFR BLD AUTO: 1.5 %
ERYTHROCYTE [DISTWIDTH] IN BLOOD BY AUTOMATED COUNT: 13.1 %
FOLATE SERPL-MCNC: 9.1 NG/ML (ref 8.7–?)
HCT VFR BLD AUTO: 39.4 %
HGB BLD-MCNC: 12.4 G/DL
IMM GRANULOCYTES # BLD AUTO: 0.01 X10(3) UL (ref 0–1)
IMM GRANULOCYTES NFR BLD: 0.2 %
LYMPHOCYTES # BLD AUTO: 2 X10(3) UL (ref 1–4)
LYMPHOCYTES NFR BLD AUTO: 44 %
MAGNESIUM SERPL-MCNC: 2.2 MG/DL (ref 1.6–2.6)
MCH RBC QN AUTO: 28.5 PG (ref 26–34)
MCHC RBC AUTO-ENTMCNC: 31.5 G/DL (ref 31–37)
MCV RBC AUTO: 90.6 FL
MONOCYTES # BLD AUTO: 0.32 X10(3) UL (ref 0.1–1)
MONOCYTES NFR BLD AUTO: 7 %
NEUTROPHILS # BLD AUTO: 2.14 X10 (3) UL (ref 1.5–7.7)
NEUTROPHILS # BLD AUTO: 2.14 X10(3) UL (ref 1.5–7.7)
NEUTROPHILS NFR BLD AUTO: 47.1 %
PLATELET # BLD AUTO: 347 10(3)UL (ref 150–450)
PROLACTIN SERPL-MCNC: 56.6 NG/ML
RBC # BLD AUTO: 4.35 X10(6)UL
TSI SER-ACNC: 2.01 MIU/ML (ref 0.36–3.74)
VIT B12 SERPL-MCNC: 612 PG/ML (ref 193–986)
WBC # BLD AUTO: 4.6 X10(3) UL (ref 4–11)

## 2022-09-06 PROCEDURE — 85025 COMPLETE CBC W/AUTO DIFF WBC: CPT | Performed by: FAMILY MEDICINE

## 2022-09-06 PROCEDURE — 99213 OFFICE O/P EST LOW 20 MIN: CPT | Performed by: FAMILY MEDICINE

## 2022-09-06 PROCEDURE — 3008F BODY MASS INDEX DOCD: CPT | Performed by: FAMILY MEDICINE

## 2022-09-06 PROCEDURE — 90750 HZV VACC RECOMBINANT IM: CPT | Performed by: FAMILY MEDICINE

## 2022-09-06 PROCEDURE — 84146 ASSAY OF PROLACTIN: CPT | Performed by: FAMILY MEDICINE

## 2022-09-06 PROCEDURE — 3078F DIAST BP <80 MM HG: CPT | Performed by: FAMILY MEDICINE

## 2022-09-06 PROCEDURE — 86664 EPSTEIN-BARR NUCLEAR ANTIGEN: CPT | Performed by: FAMILY MEDICINE

## 2022-09-06 PROCEDURE — 84443 ASSAY THYROID STIM HORMONE: CPT | Performed by: FAMILY MEDICINE

## 2022-09-06 PROCEDURE — 86665 EPSTEIN-BARR CAPSID VCA: CPT | Performed by: FAMILY MEDICINE

## 2022-09-06 PROCEDURE — 3074F SYST BP LT 130 MM HG: CPT | Performed by: FAMILY MEDICINE

## 2022-09-06 PROCEDURE — 99396 PREV VISIT EST AGE 40-64: CPT | Performed by: FAMILY MEDICINE

## 2022-09-06 PROCEDURE — 83735 ASSAY OF MAGNESIUM: CPT | Performed by: FAMILY MEDICINE

## 2022-09-06 PROCEDURE — 90472 IMMUNIZATION ADMIN EACH ADD: CPT | Performed by: FAMILY MEDICINE

## 2022-09-06 PROCEDURE — 82607 VITAMIN B-12: CPT | Performed by: FAMILY MEDICINE

## 2022-09-06 PROCEDURE — 90471 IMMUNIZATION ADMIN: CPT | Performed by: FAMILY MEDICINE

## 2022-09-06 PROCEDURE — 36415 COLL VENOUS BLD VENIPUNCTURE: CPT | Performed by: FAMILY MEDICINE

## 2022-09-06 PROCEDURE — 90715 TDAP VACCINE 7 YRS/> IM: CPT | Performed by: FAMILY MEDICINE

## 2022-09-06 PROCEDURE — 82746 ASSAY OF FOLIC ACID SERUM: CPT | Performed by: FAMILY MEDICINE

## 2022-09-06 PROCEDURE — 84630 ASSAY OF ZINC: CPT | Performed by: FAMILY MEDICINE

## 2022-09-06 RX ORDER — HYDROCHLOROTHIAZIDE 25 MG/1
25 TABLET ORAL DAILY
Qty: 90 TABLET | Refills: 1 | Status: SHIPPED | OUTPATIENT
Start: 2022-09-06

## 2022-09-06 NOTE — TELEPHONE ENCOUNTER
Pt came back into the office stating she needs a note to excuse her from work for today because she had an appt with SM.

## 2022-09-07 LAB
EBV NA IGG SER QL IA: POSITIVE
EBV VCA IGG SER QL IA: POSITIVE
EBV VCA IGM SER QL IA: NEGATIVE

## 2022-09-09 LAB — ZINC SERUM: 73.6 UG/DL

## 2022-09-19 ENCOUNTER — OFFICE VISIT (OUTPATIENT)
Dept: NEUROLOGY | Facility: CLINIC | Age: 52
End: 2022-09-19
Payer: COMMERCIAL

## 2022-09-19 VITALS
HEART RATE: 74 BPM | WEIGHT: 293 LBS | BODY MASS INDEX: 43 KG/M2 | RESPIRATION RATE: 16 BRPM | SYSTOLIC BLOOD PRESSURE: 124 MMHG | DIASTOLIC BLOOD PRESSURE: 72 MMHG

## 2022-09-19 DIAGNOSIS — G43.709 CHRONIC MIGRAINE W/O AURA W/O STATUS MIGRAINOSUS, NOT INTRACTABLE: Primary | ICD-10-CM

## 2022-09-19 DIAGNOSIS — D35.2 PITUITARY ADENOMA (HCC): ICD-10-CM

## 2022-09-19 PROCEDURE — 3074F SYST BP LT 130 MM HG: CPT | Performed by: OTHER

## 2022-09-19 PROCEDURE — 99213 OFFICE O/P EST LOW 20 MIN: CPT | Performed by: OTHER

## 2022-09-19 PROCEDURE — 3078F DIAST BP <80 MM HG: CPT | Performed by: OTHER

## 2022-09-19 RX ORDER — TOPIRAMATE 100 MG/1
TABLET, FILM COATED ORAL
Qty: 135 TABLET | Refills: 1 | Status: SHIPPED | OUTPATIENT
Start: 2022-09-19

## 2022-09-19 NOTE — PROGRESS NOTES
Patient states she had excessive diarrhea with the ajovy last dose, which was the ending of August. Patient states decrease in frequency of migraines.

## 2022-09-22 ENCOUNTER — PATIENT MESSAGE (OUTPATIENT)
Dept: INTERNAL MEDICINE CLINIC | Facility: CLINIC | Age: 52
End: 2022-09-22

## 2022-09-23 ENCOUNTER — ANESTHESIA EVENT (OUTPATIENT)
Dept: ENDOSCOPY | Facility: HOSPITAL | Age: 52
End: 2022-09-23

## 2022-09-23 ENCOUNTER — HOSPITAL ENCOUNTER (OUTPATIENT)
Facility: HOSPITAL | Age: 52
Setting detail: HOSPITAL OUTPATIENT SURGERY
Discharge: HOME | End: 2022-09-23
Attending: INTERNAL MEDICINE | Admitting: INTERNAL MEDICINE

## 2022-09-23 ENCOUNTER — ANESTHESIA (OUTPATIENT)
Dept: ENDOSCOPY | Facility: HOSPITAL | Age: 52
End: 2022-09-23

## 2022-09-23 VITALS
BODY MASS INDEX: 41.02 KG/M2 | HEART RATE: 67 BPM | TEMPERATURE: 97 F | OXYGEN SATURATION: 100 % | HEIGHT: 71 IN | SYSTOLIC BLOOD PRESSURE: 120 MMHG | WEIGHT: 293 LBS | DIASTOLIC BLOOD PRESSURE: 74 MMHG | RESPIRATION RATE: 16 BRPM

## 2022-09-23 DIAGNOSIS — Z12.11 SCREEN FOR COLON CANCER: ICD-10-CM

## 2022-09-23 PROCEDURE — 0DJD8ZZ INSPECTION OF LOWER INTESTINAL TRACT, VIA NATURAL OR ARTIFICIAL OPENING ENDOSCOPIC: ICD-10-PCS | Performed by: INTERNAL MEDICINE

## 2022-09-23 RX ORDER — SODIUM CHLORIDE, SODIUM LACTATE, POTASSIUM CHLORIDE, CALCIUM CHLORIDE 600; 310; 30; 20 MG/100ML; MG/100ML; MG/100ML; MG/100ML
INJECTION, SOLUTION INTRAVENOUS CONTINUOUS
Status: DISCONTINUED | OUTPATIENT
Start: 2022-09-23 | End: 2022-09-23

## 2022-09-23 RX ORDER — LIDOCAINE HYDROCHLORIDE 10 MG/ML
INJECTION, SOLUTION EPIDURAL; INFILTRATION; INTRACAUDAL; PERINEURAL AS NEEDED
Status: DISCONTINUED | OUTPATIENT
Start: 2022-09-23 | End: 2022-09-23 | Stop reason: SURG

## 2022-09-23 RX ADMIN — LIDOCAINE HYDROCHLORIDE 10 MG: 10 INJECTION, SOLUTION EPIDURAL; INFILTRATION; INTRACAUDAL; PERINEURAL at 10:21:00

## 2022-09-23 NOTE — ANESTHESIA POSTPROCEDURE EVALUATION
[facility] Patient Status:  Hospital Outpatient Surgery   Age/Gender 50year old female MRN [id number]   Location [address] [address] Attending Wenceslao Clarke, [facility] Day # 0 PCP Balaji Jones MD       Anesthesia Post-op Note    COLONOSCOPY    Procedure Summary     Date: 09/23/22 Room / Location: [facility] ENDOSCOPY 04 / [facility] ENDOSCOPY    Anesthesia Start: 3315 Anesthesia Stop: 1100    Procedure: COLONOSCOPY (N/A ) Diagnosis:       Screen for colon cancer      (HEMORRHOIDS)    Surgeons: Jesus Ríos DO Anesthesiologist: Yi Crump MD    Anesthesia Type: MAC ASA Status: 2          Anesthesia Type: MAC    Vitals Value Taken Time   /61 09/23/22 1100   Temp 98 09/23/22 1103   Pulse 74 09/23/22 1102   Resp 12 09/23/22 1103   SpO2 100 % 09/23/22 1102   Vitals shown include unvalidated device data. Patient Location: PACU    Anesthesia Type: MAC    Airway Patency: patent    Postop Pain Control: adequate    Mental Status: sedated until time of extubation    Nausea/Vomiting: none    Cardiopulmonary/Hydration status: stable euvolemic    Complications: no apparent anesthesia related complications    Postop vital signs: stable    Dental Exam: Unchanged from Preop    Patient to be discharged home when criteria met.

## 2022-09-23 NOTE — TELEPHONE ENCOUNTER
Noted if continues will repeat PT,PTT, and get vit K,Vit B1, BMP.  Low Vit C and E may also contribute to easy bruising

## 2022-11-02 ENCOUNTER — HOSPITAL ENCOUNTER (OUTPATIENT)
Age: 52
Discharge: HOME | End: 2022-11-02
Payer: COMMERCIAL

## 2022-11-02 VITALS
WEIGHT: 293 LBS | DIASTOLIC BLOOD PRESSURE: 88 MMHG | RESPIRATION RATE: 20 BRPM | OXYGEN SATURATION: 100 % | SYSTOLIC BLOOD PRESSURE: 131 MMHG | BODY MASS INDEX: 41.02 KG/M2 | TEMPERATURE: 98 F | HEIGHT: 71 IN | HEART RATE: 89 BPM

## 2022-11-02 DIAGNOSIS — U07.1 COVID-19: Primary | ICD-10-CM

## 2022-11-02 LAB
POCT INFLUENZA A: NEGATIVE
POCT INFLUENZA B: NEGATIVE
SARS-COV-2 RNA RESP QL NAA+PROBE: DETECTED

## 2022-11-02 PROCEDURE — 87502 INFLUENZA DNA AMP PROBE: CPT | Performed by: NURSE PRACTITIONER

## 2022-11-02 PROCEDURE — 99213 OFFICE O/P EST LOW 20 MIN: CPT

## 2022-11-02 PROCEDURE — 99214 OFFICE O/P EST MOD 30 MIN: CPT

## 2022-11-02 RX ORDER — NIRMATRELVIR AND RITONAVIR 300-100 MG
KIT ORAL
Qty: 30 TABLET | Refills: 0 | Status: SHIPPED | OUTPATIENT
Start: 2022-11-02 | End: 2022-11-07

## 2022-11-02 NOTE — DISCHARGE INSTRUCTIONS
1. Clear liquids, increased fluid intake  2. Fever control or pain, you may use ibuprofen, or acetaminophen as directed    3. Monitor pulse ox throughout the day, 93% or greater, if you are less than 93% call your primary care physician or go to the emergency department  4. Return for respiratory distress, productive cough, vomiting, or any other changes in your condition    5. Rest  6. Start taking a multivitamin that includes vitamin C, vitamin D and zinc.  Melatonin at nighttime. CDC quarantine recommendations   Stay home for 5 days. If you have no symptoms or your symptoms are resolving after 5 days, you can leave your house. Continue to wear a mask around others for 5 additional days. If you have a fever, continue to stay home until your fever resolves.

## 2022-11-19 DIAGNOSIS — G43.009 MIGRAINE WITHOUT AURA AND WITHOUT STATUS MIGRAINOSUS, NOT INTRACTABLE: ICD-10-CM

## 2022-12-05 ENCOUNTER — TELEMEDICINE (OUTPATIENT)
Dept: INTERNAL MEDICINE CLINIC | Facility: CLINIC | Age: 52
End: 2022-12-05
Payer: COMMERCIAL

## 2022-12-05 DIAGNOSIS — J01.90 ACUTE NON-RECURRENT SINUSITIS, UNSPECIFIED LOCATION: Primary | ICD-10-CM

## 2022-12-05 RX ORDER — AMOXICILLIN AND CLAVULANATE POTASSIUM 875; 125 MG/1; MG/1
1 TABLET, FILM COATED ORAL 2 TIMES DAILY
Qty: 20 TABLET | Refills: 0 | Status: SHIPPED | OUTPATIENT
Start: 2022-12-05 | End: 2022-12-15

## 2022-12-13 RX ORDER — FREMANEZUMAB-VFRM 225 MG/1.5ML
INJECTION SUBCUTANEOUS
Qty: 4.5 ML | Refills: 0 | Status: SHIPPED
Start: 2022-12-13

## 2023-01-01 DIAGNOSIS — G43.009 MIGRAINE WITHOUT AURA AND WITHOUT STATUS MIGRAINOSUS, NOT INTRACTABLE: ICD-10-CM

## 2023-01-01 DIAGNOSIS — F41.9 ANXIETY: ICD-10-CM

## 2023-01-01 RX ORDER — FREMANEZUMAB-VFRM 225 MG/1.5ML
225 INJECTION SUBCUTANEOUS
Qty: 4.5 ML | Refills: 0 | Status: CANCELLED | OUTPATIENT
Start: 2023-01-01

## 2023-01-02 RX ORDER — DIAZEPAM 5 MG/1
TABLET ORAL
Qty: 30 TABLET | Refills: 0 | Status: SHIPPED | OUTPATIENT
Start: 2023-01-02

## 2023-01-03 ENCOUNTER — TELEPHONE (OUTPATIENT)
Dept: SURGERY | Facility: CLINIC | Age: 53
End: 2023-01-03

## 2023-01-03 ENCOUNTER — TELEMEDICINE (OUTPATIENT)
Dept: INTERNAL MEDICINE CLINIC | Facility: CLINIC | Age: 53
End: 2023-01-03

## 2023-01-03 ENCOUNTER — TELEPHONE (OUTPATIENT)
Dept: NEUROLOGY | Facility: CLINIC | Age: 53
End: 2023-01-03

## 2023-01-03 DIAGNOSIS — J01.90 ACUTE NON-RECURRENT SINUSITIS, UNSPECIFIED LOCATION: Primary | ICD-10-CM

## 2023-01-03 DIAGNOSIS — J20.9 ACUTE BRONCHITIS, UNSPECIFIED ORGANISM: ICD-10-CM

## 2023-01-03 DIAGNOSIS — G43.009 MIGRAINE WITHOUT AURA AND WITHOUT STATUS MIGRAINOSUS, NOT INTRACTABLE: ICD-10-CM

## 2023-01-03 PROCEDURE — 99213 OFFICE O/P EST LOW 20 MIN: CPT | Performed by: FAMILY MEDICINE

## 2023-01-03 RX ORDER — FREMANEZUMAB-VFRM 225 MG/1.5ML
INJECTION SUBCUTANEOUS
Refills: 0 | OUTPATIENT
Start: 2023-01-03

## 2023-01-03 RX ORDER — FREMANEZUMAB-VFRM 225 MG/1.5ML
225 INJECTION SUBCUTANEOUS
Qty: 1.5 ML | Refills: 0 | Status: SHIPPED | OUTPATIENT
Start: 2023-01-03

## 2023-01-03 RX ORDER — AMOXICILLIN AND CLAVULANATE POTASSIUM 875; 125 MG/1; MG/1
1 TABLET, FILM COATED ORAL 2 TIMES DAILY
Qty: 20 TABLET | Refills: 0 | Status: SHIPPED | OUTPATIENT
Start: 2023-01-03 | End: 2023-01-13

## 2023-01-03 RX ORDER — METHYLPREDNISOLONE 4 MG/1
TABLET ORAL
Qty: 1 EACH | Refills: 0 | Status: SHIPPED | OUTPATIENT
Start: 2023-01-03

## 2023-01-03 NOTE — TELEPHONE ENCOUNTER
Re-ordered one pen for patient at [facility] in [place]. Refills at [facility] in [place] remain. Patient can transfer.

## 2023-01-03 NOTE — TELEPHONE ENCOUNTER
Pt is calling to check the status of the Ajovi been sent to her pharmacy. Pt states [facility] told her they sent over a request a few times and will no longer continue to send the requests.  Pt is requesting a call back to discuss

## 2023-01-03 NOTE — TELEPHONE ENCOUNTER
Refused request medication was sent in December. Never received per pharmacy. Called in a verbal on 01/3/23.

## 2023-01-03 NOTE — TELEPHONE ENCOUNTER
Pt called medication was sent to wrong pharmacy.  Pt needs prescription sent to the following [facility]Y [facility][id number]- [place], [phone]

## 2023-01-03 NOTE — TELEPHONE ENCOUNTER
Spoke to the pharmacy and gave verbal for the prescription that failed to be transmitted to the pharmacy. Spoke to patient and advised verbal was given to pharmacy and prescription should be ready for  later.

## 2023-01-11 ENCOUNTER — HOSPITAL ENCOUNTER (OUTPATIENT)
Dept: MAMMOGRAPHY | Age: 53
Discharge: HOME | End: 2023-01-11
Attending: FAMILY MEDICINE
Payer: COMMERCIAL

## 2023-01-11 DIAGNOSIS — Z12.31 ENCOUNTER FOR SCREENING MAMMOGRAM FOR MALIGNANT NEOPLASM OF BREAST: ICD-10-CM

## 2023-01-11 PROCEDURE — 77067 SCR MAMMO BI INCL CAD: CPT | Performed by: FAMILY MEDICINE

## 2023-01-11 PROCEDURE — 77063 BREAST TOMOSYNTHESIS BI: CPT | Performed by: FAMILY MEDICINE

## 2023-01-19 ENCOUNTER — OFFICE VISIT (OUTPATIENT)
Dept: NEUROLOGY | Facility: CLINIC | Age: 53
End: 2023-01-19
Payer: COMMERCIAL

## 2023-01-19 VITALS
DIASTOLIC BLOOD PRESSURE: 70 MMHG | RESPIRATION RATE: 18 BRPM | HEART RATE: 78 BPM | BODY MASS INDEX: 44 KG/M2 | WEIGHT: 293 LBS | SYSTOLIC BLOOD PRESSURE: 112 MMHG

## 2023-01-19 DIAGNOSIS — G43.709 CHRONIC MIGRAINE W/O AURA W/O STATUS MIGRAINOSUS, NOT INTRACTABLE: Primary | ICD-10-CM

## 2023-01-19 PROCEDURE — 3078F DIAST BP <80 MM HG: CPT | Performed by: OTHER

## 2023-01-19 PROCEDURE — 99213 OFFICE O/P EST LOW 20 MIN: CPT | Performed by: OTHER

## 2023-01-19 PROCEDURE — 3074F SYST BP LT 130 MM HG: CPT | Performed by: OTHER

## 2023-01-19 RX ORDER — NARATRIPTAN 2.5 MG/1
2.5 TABLET ORAL AS NEEDED
Qty: 10 TABLET | Refills: 2 | Status: SHIPPED | OUTPATIENT
Start: 2023-01-19

## 2023-03-17 DIAGNOSIS — F41.9 ANXIETY: ICD-10-CM

## 2023-03-18 RX ORDER — DIAZEPAM 5 MG/1
TABLET ORAL
Qty: 30 TABLET | Refills: 0 | Status: SHIPPED | OUTPATIENT
Start: 2023-03-18

## 2023-04-06 DIAGNOSIS — F41.9 ANXIETY: ICD-10-CM

## 2023-04-07 RX ORDER — DIAZEPAM 5 MG/1
TABLET ORAL
Qty: 30 TABLET | Refills: 0 | Status: SHIPPED
Start: 2023-04-07

## 2023-04-07 RX ORDER — ERGOCALCIFEROL 1.25 MG/1
CAPSULE ORAL
Qty: 12 CAPSULE | Refills: 1 | OUTPATIENT
Start: 2023-04-07

## 2023-04-11 DIAGNOSIS — F41.9 ANXIETY: ICD-10-CM

## 2023-04-11 RX ORDER — DIAZEPAM 5 MG/1
TABLET ORAL
Qty: 30 TABLET | Refills: 0 | OUTPATIENT
Start: 2023-04-11

## 2023-04-13 DIAGNOSIS — G43.009 MIGRAINE WITHOUT AURA AND WITHOUT STATUS MIGRAINOSUS, NOT INTRACTABLE: ICD-10-CM

## 2023-04-13 RX ORDER — FREMANEZUMAB-VFRM 225 MG/1.5ML
225 INJECTION SUBCUTANEOUS
Qty: 1.5 ML | Refills: 2 | Status: SHIPPED | OUTPATIENT
Start: 2023-04-13

## 2023-05-03 ENCOUNTER — PATIENT MESSAGE (OUTPATIENT)
Dept: NEUROLOGY | Facility: CLINIC | Age: 53
End: 2023-05-03

## 2023-05-03 NOTE — TELEPHONE ENCOUNTER
From: Federico Baeza  To: Erik Chan MD  Sent: 5/3/2023 8:22 AM CDT  Subject: Error    I accidentally canceled my appointment online trying to confirm it for today. Please keep my appointment for today.

## 2023-06-19 ENCOUNTER — OFFICE VISIT (OUTPATIENT)
Dept: NEUROLOGY | Facility: CLINIC | Age: 53
End: 2023-06-19
Payer: COMMERCIAL

## 2023-06-19 VITALS
SYSTOLIC BLOOD PRESSURE: 132 MMHG | HEART RATE: 71 BPM | WEIGHT: 293 LBS | BODY MASS INDEX: 41.02 KG/M2 | HEIGHT: 71 IN | DIASTOLIC BLOOD PRESSURE: 68 MMHG | OXYGEN SATURATION: 94 %

## 2023-06-19 DIAGNOSIS — G43.009 MIGRAINE WITHOUT AURA AND WITHOUT STATUS MIGRAINOSUS, NOT INTRACTABLE: Primary | ICD-10-CM

## 2023-06-19 PROCEDURE — 3078F DIAST BP <80 MM HG: CPT | Performed by: OTHER

## 2023-06-19 PROCEDURE — 3075F SYST BP GE 130 - 139MM HG: CPT | Performed by: OTHER

## 2023-06-19 PROCEDURE — 99213 OFFICE O/P EST LOW 20 MIN: CPT | Performed by: OTHER

## 2023-06-19 PROCEDURE — 3008F BODY MASS INDEX DOCD: CPT | Performed by: OTHER

## 2023-06-19 RX ORDER — NARATRIPTAN 2.5 MG/1
2.5 TABLET ORAL AS NEEDED
Qty: 10 TABLET | Refills: 5 | Status: SHIPPED | OUTPATIENT
Start: 2023-06-19

## 2023-06-19 RX ORDER — FREMANEZUMAB-VFRM 225 MG/1.5ML
225 INJECTION SUBCUTANEOUS
Qty: 1.5 ML | Refills: 5 | Status: SHIPPED | OUTPATIENT
Start: 2023-06-19

## 2023-06-19 RX ORDER — ERENUMAB-AOOE 140 MG/ML
INJECTION, SOLUTION SUBCUTANEOUS
COMMUNITY
Start: 2022-05-24 | End: 2023-06-19

## 2023-06-19 RX ORDER — RIZATRIPTAN BENZOATE 10 MG/1
TABLET, ORALLY DISINTEGRATING ORAL
COMMUNITY
Start: 2023-03-31 | End: 2023-06-19

## 2023-06-19 RX ORDER — TRIAMTERENE AND HYDROCHLOROTHIAZIDE 37.5; 25 MG/1; MG/1
1 TABLET ORAL DAILY
COMMUNITY
Start: 2022-05-24

## 2023-07-06 ENCOUNTER — TELEPHONE (OUTPATIENT)
Dept: NEUROLOGY | Facility: CLINIC | Age: 53
End: 2023-07-06

## 2023-07-06 NOTE — TELEPHONE ENCOUNTER
Pt called to check the status of her [facility] paperwork. There is no TE showing the paperwork was received. She will resend.

## 2023-07-13 ENCOUNTER — PATIENT MESSAGE (OUTPATIENT)
Dept: NEUROLOGY | Facility: CLINIC | Age: 53
End: 2023-07-13

## 2023-07-14 ENCOUNTER — TELEPHONE (OUTPATIENT)
Dept: NEUROLOGY | Facility: CLINIC | Age: 53
End: 2023-07-14

## 2023-07-14 NOTE — TELEPHONE ENCOUNTER
FMLA paper work placed in appropriate area of nurse station. SANDRA form sent to patient via [employer] attachment.  See alternate TE.

## 2023-07-17 NOTE — TELEPHONE ENCOUNTER
Acute Migraine assessment:    Is this your typical migraine? Describe any change in character from past migraines.   Yes:     Location of Pain (select all that apply):  behind eyes AND BACK OF HEAD  # Days pain has been present:  2    Describe the pain:  T Outreach attempt was made to schedule a Medicare Wellness Visit. This was the second attempt. Contact was not made, left message.

## 2023-07-19 DIAGNOSIS — F41.9 ANXIETY: ICD-10-CM

## 2023-07-19 RX ORDER — DIAZEPAM 5 MG/1
5 TABLET ORAL DAILY PRN
Qty: 30 TABLET | Refills: 0 | Status: SHIPPED | OUTPATIENT
Start: 2023-07-19

## 2023-07-19 NOTE — TELEPHONE ENCOUNTER
Requesting        diazePAM 5 MG Oral Tab          Sig: Take 1 tablet (5 mg total) by mouth daily as needed for Sleep.     Disp: 30 tablet    Refills: 0    Start: 7/19/2023    Class: Normal    For: Anxiety    To pharmacy: Not to exceed 5 additional fills before 09/14/2023    Last ordered: 3 months ago by Gay Boykin MD        LOV: 9/6/2022  RTC: 1 year   Last Relevant Labs: n/a   Filled: 4/7/2023 #30 with 0 refills    Future Appointments   Date Time Provider \[facility]\""   9/16/2023  9:30 AM LUIS Lewis [facility] [place]   10/5/2023  3:40 PM MD SCHUYLER SteinbergOLINGBRK [facility] [place]

## 2023-07-19 NOTE — TELEPHONE ENCOUNTER
Requesting   Triamterene-HCTZ 37.5-25 MG Oral Tab          Sig: Take 1 tablet by mouth daily.     Disp: Not specified    Refills: 0    Start: 7/19/2023    Class: Normal    Non-formulary    Last ordered: 1 month ago by Reported External/Patient     Hypertension Medications Protocol [id number]/19/2023 01:51 PM   Protocol Details CMP or BMP in past 12 months    Last serum creatinine< 2.0    Appointment in past 6 or next 3 months        LOV: 9/6/2022  RTC: 1 year   Last Relevant Labs: 6/10/2022  Filled: Historical    Future Appointments   Date Time Provider Minna Juan   9/16/2023  9:30 AM LUIS Lewis [facility] [place]   10/5/2023  3:40 PM Dawson Araya MD [facility] [place]

## 2023-07-20 RX ORDER — TRIAMTERENE AND HYDROCHLOROTHIAZIDE 37.5; 25 MG/1; MG/1
1 TABLET ORAL DAILY
Qty: 30 TABLET | Refills: 0 | Status: SHIPPED | OUTPATIENT
Start: 2023-07-20

## 2023-07-28 ENCOUNTER — TELEPHONE (OUTPATIENT)
Dept: NEUROLOGY | Facility: CLINIC | Age: 53
End: 2023-07-28

## 2023-08-17 RX ORDER — TRIAMTERENE AND HYDROCHLOROTHIAZIDE 37.5; 25 MG/1; MG/1
1 TABLET ORAL DAILY
Qty: 30 TABLET | Refills: 0 | Status: SHIPPED | OUTPATIENT
Start: 2023-08-17

## 2023-08-17 NOTE — TELEPHONE ENCOUNTER
Triamterene-hydrochlorothiazide 37.5-25 mg  Filled 7-20-23  Qty 30  0 refills  Future Appointments   Date Time Provider [facility] Yessica   9/16/2023  9:30 AM LUIS Payne [facility] [place]   10/5/2023  3:40 PM Thea Araya MD [facility] [place]   LOV 9-6-22

## 2023-09-12 RX ORDER — ERGOCALCIFEROL 1.25 MG/1
50000 CAPSULE ORAL WEEKLY
Qty: 12 CAPSULE | Refills: 1 | OUTPATIENT
Start: 2023-09-12

## 2023-09-12 RX ORDER — VALACYCLOVIR HYDROCHLORIDE 500 MG/1
500 TABLET, FILM COATED ORAL DAILY
Qty: 90 TABLET | Refills: 0 | OUTPATIENT
Start: 2023-09-12

## 2023-09-15 ENCOUNTER — TELEPHONE (OUTPATIENT)
Dept: NEUROLOGY | Facility: CLINIC | Age: 53
End: 2023-09-15

## 2023-09-16 ENCOUNTER — OFFICE VISIT (OUTPATIENT)
Dept: INTERNAL MEDICINE CLINIC | Facility: CLINIC | Age: 53
End: 2023-09-16
Payer: COMMERCIAL

## 2023-09-16 ENCOUNTER — LAB ENCOUNTER (OUTPATIENT)
Dept: LAB | Age: 53
End: 2023-09-16
Attending: FAMILY MEDICINE
Payer: COMMERCIAL

## 2023-09-16 VITALS
BODY MASS INDEX: 46 KG/M2 | SYSTOLIC BLOOD PRESSURE: 112 MMHG | HEART RATE: 88 BPM | DIASTOLIC BLOOD PRESSURE: 80 MMHG | WEIGHT: 293 LBS | RESPIRATION RATE: 98 BRPM

## 2023-09-16 DIAGNOSIS — N89.8 VAGINAL DISCHARGE: ICD-10-CM

## 2023-09-16 DIAGNOSIS — D35.2 BENIGN NEOPLASM OF PITUITARY GLAND AND CRANIOPHARYNGEAL DUCT (POUCH) (HCC): ICD-10-CM

## 2023-09-16 DIAGNOSIS — Z00.00 ROUTINE GENERAL MEDICAL EXAMINATION AT A HEALTH CARE FACILITY: ICD-10-CM

## 2023-09-16 DIAGNOSIS — B00.9 HERPES SIMPLEX VIRUS (HSV) INFECTION: ICD-10-CM

## 2023-09-16 DIAGNOSIS — Z23 NEED FOR SHINGLES VACCINE: ICD-10-CM

## 2023-09-16 DIAGNOSIS — E55.9 VITAMIN D DEFICIENCY: ICD-10-CM

## 2023-09-16 DIAGNOSIS — R60.0 LOCALIZED EDEMA: ICD-10-CM

## 2023-09-16 DIAGNOSIS — Z12.31 ENCOUNTER FOR SCREENING MAMMOGRAM FOR BREAST CANCER: ICD-10-CM

## 2023-09-16 DIAGNOSIS — Z00.00 ROUTINE GENERAL MEDICAL EXAMINATION AT A HEALTH CARE FACILITY: Primary | ICD-10-CM

## 2023-09-16 DIAGNOSIS — D35.3 BENIGN NEOPLASM OF PITUITARY GLAND AND CRANIOPHARYNGEAL DUCT (POUCH) (HCC): ICD-10-CM

## 2023-09-16 LAB
ALBUMIN SERPL-MCNC: 3.3 G/DL (ref 3.4–5)
ALBUMIN/GLOB SERPL: 0.8 {RATIO} (ref 1–2)
ALP LIVER SERPL-CCNC: 67 U/L
ALT SERPL-CCNC: 26 U/L
ANION GAP SERPL CALC-SCNC: 4 MMOL/L (ref 0–18)
AST SERPL-CCNC: 24 U/L (ref 15–37)
BASOPHILS # BLD AUTO: 0.01 X10(3) UL (ref 0–0.2)
BASOPHILS NFR BLD AUTO: 0.2 %
BILIRUB SERPL-MCNC: 0.3 MG/DL (ref 0.1–2)
BUN BLD-MCNC: 8 MG/DL (ref 7–18)
CALCIUM BLD-MCNC: 9.4 MG/DL (ref 8.5–10.1)
CHLORIDE SERPL-SCNC: 107 MMOL/L (ref 98–112)
CHOLEST SERPL-MCNC: 142 MG/DL (ref ?–200)
CO2 SERPL-SCNC: 26 MMOL/L (ref 21–32)
CREAT BLD-MCNC: 0.78 MG/DL
EGFRCR SERPLBLD CKD-EPI 2021: 92 ML/MIN/1.73M2 (ref 60–?)
EOSINOPHIL # BLD AUTO: 0.09 X10(3) UL (ref 0–0.7)
EOSINOPHIL NFR BLD AUTO: 1.7 %
ERYTHROCYTE [DISTWIDTH] IN BLOOD BY AUTOMATED COUNT: 12.2 %
EST. AVERAGE GLUCOSE BLD GHB EST-MCNC: 120 MG/DL (ref 68–126)
FASTING PATIENT LIPID ANSWER: YES
FASTING STATUS PATIENT QL REPORTED: YES
GLOBULIN PLAS-MCNC: 4.1 G/DL (ref 2.8–4.4)
GLUCOSE BLD-MCNC: 91 MG/DL (ref 70–99)
HBA1C MFR BLD: 5.8 % (ref ?–5.7)
HCT VFR BLD AUTO: 38.3 %
HDLC SERPL-MCNC: 49 MG/DL (ref 40–59)
HGB BLD-MCNC: 12.2 G/DL
IMM GRANULOCYTES # BLD AUTO: 0.01 X10(3) UL (ref 0–1)
IMM GRANULOCYTES NFR BLD: 0.2 %
LDLC SERPL CALC-MCNC: 76 MG/DL (ref ?–100)
LYMPHOCYTES # BLD AUTO: 2.07 X10(3) UL (ref 1–4)
LYMPHOCYTES NFR BLD AUTO: 40 %
MCH RBC QN AUTO: 28.2 PG (ref 26–34)
MCHC RBC AUTO-ENTMCNC: 31.9 G/DL (ref 31–37)
MCV RBC AUTO: 88.7 FL
MONOCYTES # BLD AUTO: 0.31 X10(3) UL (ref 0.1–1)
MONOCYTES NFR BLD AUTO: 6 %
NEUTROPHILS # BLD AUTO: 2.68 X10 (3) UL (ref 1.5–7.7)
NEUTROPHILS # BLD AUTO: 2.68 X10(3) UL (ref 1.5–7.7)
NEUTROPHILS NFR BLD AUTO: 51.9 %
NONHDLC SERPL-MCNC: 93 MG/DL (ref ?–130)
OSMOLALITY SERPL CALC.SUM OF ELEC: 282 MOSM/KG (ref 275–295)
PLATELET # BLD AUTO: 276 10(3)UL (ref 150–450)
POTASSIUM SERPL-SCNC: 3.5 MMOL/L (ref 3.5–5.1)
PROLACTIN SERPL-MCNC: 66.1 NG/ML
PROT SERPL-MCNC: 7.4 G/DL (ref 6.4–8.2)
RBC # BLD AUTO: 4.32 X10(6)UL
SODIUM SERPL-SCNC: 137 MMOL/L (ref 136–145)
TRIGL SERPL-MCNC: 88 MG/DL (ref 30–149)
TSI SER-ACNC: 2.65 MIU/ML (ref 0.36–3.74)
VIT D+METAB SERPL-MCNC: 32.8 NG/ML (ref 30–100)
VLDLC SERPL CALC-MCNC: 14 MG/DL (ref 0–30)
WBC # BLD AUTO: 5.2 X10(3) UL (ref 4–11)

## 2023-09-16 PROCEDURE — 3079F DIAST BP 80-89 MM HG: CPT | Performed by: FAMILY MEDICINE

## 2023-09-16 PROCEDURE — 84443 ASSAY THYROID STIM HORMONE: CPT | Performed by: FAMILY MEDICINE

## 2023-09-16 PROCEDURE — 87510 GARDNER VAG DNA DIR PROBE: CPT | Performed by: FAMILY MEDICINE

## 2023-09-16 PROCEDURE — 87660 TRICHOMONAS VAGIN DIR PROBE: CPT | Performed by: FAMILY MEDICINE

## 2023-09-16 PROCEDURE — 87480 CANDIDA DNA DIR PROBE: CPT | Performed by: FAMILY MEDICINE

## 2023-09-16 PROCEDURE — 80061 LIPID PANEL: CPT | Performed by: FAMILY MEDICINE

## 2023-09-16 PROCEDURE — 99213 OFFICE O/P EST LOW 20 MIN: CPT | Performed by: FAMILY MEDICINE

## 2023-09-16 PROCEDURE — 80053 COMPREHEN METABOLIC PANEL: CPT | Performed by: FAMILY MEDICINE

## 2023-09-16 PROCEDURE — 82306 VITAMIN D 25 HYDROXY: CPT

## 2023-09-16 PROCEDURE — 83036 HEMOGLOBIN GLYCOSYLATED A1C: CPT | Performed by: FAMILY MEDICINE

## 2023-09-16 PROCEDURE — 85025 COMPLETE CBC W/AUTO DIFF WBC: CPT | Performed by: FAMILY MEDICINE

## 2023-09-16 PROCEDURE — 99396 PREV VISIT EST AGE 40-64: CPT | Performed by: FAMILY MEDICINE

## 2023-09-16 PROCEDURE — 36415 COLL VENOUS BLD VENIPUNCTURE: CPT | Performed by: FAMILY MEDICINE

## 2023-09-16 PROCEDURE — 84146 ASSAY OF PROLACTIN: CPT | Performed by: FAMILY MEDICINE

## 2023-09-16 PROCEDURE — 90750 HZV VACC RECOMBINANT IM: CPT | Performed by: FAMILY MEDICINE

## 2023-09-16 PROCEDURE — 3074F SYST BP LT 130 MM HG: CPT | Performed by: FAMILY MEDICINE

## 2023-09-16 PROCEDURE — 90471 IMMUNIZATION ADMIN: CPT | Performed by: FAMILY MEDICINE

## 2023-09-16 RX ORDER — VALACYCLOVIR HYDROCHLORIDE 500 MG/1
500 TABLET, FILM COATED ORAL DAILY
Qty: 90 TABLET | Refills: 0 | Status: SHIPPED | OUTPATIENT
Start: 2023-09-16

## 2023-09-16 RX ORDER — TRIAMTERENE AND HYDROCHLOROTHIAZIDE 37.5; 25 MG/1; MG/1
1 CAPSULE ORAL EVERY MORNING
Qty: 90 CAPSULE | Refills: 1 | Status: SHIPPED | OUTPATIENT
Start: 2023-09-16

## 2023-09-19 ENCOUNTER — PATIENT MESSAGE (OUTPATIENT)
Dept: INTERNAL MEDICINE CLINIC | Facility: CLINIC | Age: 53
End: 2023-09-19

## 2023-09-19 DIAGNOSIS — M79.672 LEFT FOOT PAIN: Primary | ICD-10-CM

## 2023-09-19 NOTE — TELEPHONE ENCOUNTER
Xray order faxed to [id number] Outpatient Rad. Dept (fax [phone]) with confirmation. [facility] response sent to patient informing.

## 2023-09-19 NOTE — TELEPHONE ENCOUNTER
From: Vania Simon  To: Micah Show  Sent: 9/19/2023 7:58 AM CDT  Subject: Swollen legs and feet    Good morning,    I wanted you to see what I was referring to about my feet. I have attached pictures. This the left foot it hurt to even stand on it.

## 2023-09-19 NOTE — TELEPHONE ENCOUNTER
I reviewed the foot photos. I would like to get a xray of that left foot to take a closer look structurally at the foot. I just want to make sure that there is nothing else going on with it.

## 2023-09-20 ENCOUNTER — TELEPHONE (OUTPATIENT)
Dept: INTERNAL MEDICINE CLINIC | Facility: CLINIC | Age: 53
End: 2023-09-20

## 2023-09-20 NOTE — TELEPHONE ENCOUNTER
Received a fax from [facility] requesting medical records     [address] [place]aldo Reyes [facility] [id number] [place] [address] [address]  Phone: [phone] or [phone]  Fx: [phone]    Faxed to medical records department    Sent to [facility]

## 2023-09-22 ENCOUNTER — TELEPHONE (OUTPATIENT)
Dept: INTERNAL MEDICINE CLINIC | Facility: CLINIC | Age: 53
End: 2023-09-22

## 2023-09-25 NOTE — TELEPHONE ENCOUNTER
Please inform Pt her foot xray is negative. If the foot pain continue she should see a podiatrist like Dr. Ester Wiggins.

## 2023-10-02 ENCOUNTER — PATIENT MESSAGE (OUTPATIENT)
Dept: INTERNAL MEDICINE CLINIC | Facility: CLINIC | Age: 53
End: 2023-10-02

## 2023-10-03 NOTE — TELEPHONE ENCOUNTER
From: Shara Barfield  To: [place]  Sent: 10/2/2023 4:48 PM CDT  Subject: Hot Flashes     Is there anything I can do to help with the hot flashes?

## 2023-10-03 NOTE — TELEPHONE ENCOUNTER
SM- Please advise. TY!    -OV to discuss further (potential medications?)  -General lifestyle modifications (avoid/limit caffeine, smoking, alcohol, manage weight. ..)?      -LOV  9/16/23

## 2023-10-03 NOTE — TELEPHONE ENCOUNTER
Pt can wear layers she can remove to lessen the hot flashes and sweats. Use a fan and drink plenty of cold water. - Use otc herbal preparations like I-Cool, Remifemin, or Estroven or see Gyne for hormone replacement. The otc preparations can take 4-6 weeks to help decrease the hot flashes but they will help some. Dotty Villagomez

## 2023-11-24 DIAGNOSIS — G43.009 MIGRAINE WITHOUT AURA AND WITHOUT STATUS MIGRAINOSUS, NOT INTRACTABLE: ICD-10-CM

## 2023-11-24 DIAGNOSIS — G43.709 CHRONIC MIGRAINE W/O AURA W/O STATUS MIGRAINOSUS, NOT INTRACTABLE: Primary | ICD-10-CM

## 2023-11-24 DIAGNOSIS — E78.00 PURE HYPERCHOLESTEROLEMIA: ICD-10-CM

## 2023-11-26 RX ORDER — ROSUVASTATIN CALCIUM 10 MG/1
10 TABLET, COATED ORAL DAILY
Qty: 90 TABLET | Refills: 0 | Status: SHIPPED | OUTPATIENT
Start: 2023-11-26

## 2023-11-27 RX ORDER — FREMANEZUMAB-VFRM 225 MG/1.5ML
225 INJECTION SUBCUTANEOUS
Qty: 1.5 ML | Refills: 2 | Status: SHIPPED | OUTPATIENT
Start: 2023-11-27

## 2023-11-27 RX ORDER — ONDANSETRON 4 MG/1
4 TABLET, FILM COATED ORAL EVERY 8 HOURS PRN
Qty: 30 TABLET | Refills: 0 | Status: SHIPPED | OUTPATIENT
Start: 2023-11-27

## 2023-11-27 NOTE — TELEPHONE ENCOUNTER
Medication: topiramate 100 MG Oral Tab     Date of last refill: 09/19/22 (135/1)        Medication: Naratriptan HCl 2.5 MG Oral Tab    Date of last refill: 06/19/23 (10/5)  [place]09[place]23    Last office visit: 06/19/23  Due back to clinic per last office note:  6 months  Date next office visit scheduled:  No future appointments.     Last OV note recommendation:     PLAN:      (G43.709) Chronic migraine w/o aura w/o status migrainosus, not intractable  (primary encounter diagnosis)     Stable overall     Continue  Ajovy for prevention  Continue naratriptan  Continue Topamax 50 mg AM and 100 mg PM        Pituitary adenoma- on Cabergoline  Follows with [facility]        Follow up in about 6 month

## 2023-11-27 NOTE — TELEPHONE ENCOUNTER
Medication: ondansetron (ZOFRAN) 4 mg tablet      Date of last refill: 03/18/2022 (#30/0)  [place]     Last office visit: 06/19/2023  Due back to clinic per last office note:  Around 12/19/2023  Date next office visit scheduled:    No future appointments. Last OV note recommendation:    Assessment  ASSESSMENT/PLAN:      (G43.709) Chronic migraine w/o aura w/o status migrainosus, not intractable  (primary encounter diagnosis)     Stable overall     Continue  Ajovy for prevention  Continue naratriptan  Continue Topamax 50 mg AM and 100 mg PM        Pituitary adenoma- on Cabergoline  Follows with [facility]        Follow up in about 6 month  See orders and medications filed with this encounter. The patient indicates understanding of these issues and agrees with the plan. No orders of the defined types were placed in this encounter.

## 2023-11-27 NOTE — TELEPHONE ENCOUNTER
Medication: Fremanezumab-vfrm (AJOVY) 225 MG/1.5ML Subcutaneous Solution Auto-injector      Date of last refill: 06/19/2023 (#1.5 mL/5)  [place]     Last office visit: 06/19/2023  Due back to clinic per last office note:  Around 12/19/2023  Date next office visit scheduled:    No future appointments. Last OV note recommendation:    ASSESSMENT/PLAN:      (G43.709) Chronic migraine w/o aura w/o status migrainosus, not intractable  (primary encounter diagnosis)     Stable overall     Continue  Ajovy for prevention  Continue naratriptan  Continue Topamax 50 mg AM and 100 mg PM        Pituitary adenoma- on Cabergoline  Follows with [facility]        Follow up in about 6 month  See orders and medications filed with this encounter. The patient indicates understanding of these issues and agrees with the plan. No orders of the defined types were placed in this encounter.

## 2023-11-28 RX ORDER — TOPIRAMATE 100 MG/1
TABLET, FILM COATED ORAL
Qty: 135 TABLET | Refills: 0 | Status: SHIPPED | OUTPATIENT
Start: 2023-11-28

## 2023-11-28 RX ORDER — NARATRIPTAN 2.5 MG/1
2.5 TABLET ORAL AS NEEDED
Qty: 10 TABLET | Refills: 0 | Status: SHIPPED | OUTPATIENT
Start: 2023-11-28

## 2024-01-16 ENCOUNTER — HOSPITAL ENCOUNTER (EMERGENCY)
Age: 54
Discharge: HOME | End: 2024-01-16
Attending: EMERGENCY MEDICINE
Payer: COMMERCIAL

## 2024-01-16 VITALS
DIASTOLIC BLOOD PRESSURE: 98 MMHG | WEIGHT: 293 LBS | HEART RATE: 76 BPM | OXYGEN SATURATION: 98 % | BODY MASS INDEX: 41.02 KG/M2 | RESPIRATION RATE: 16 BRPM | SYSTOLIC BLOOD PRESSURE: 137 MMHG | HEIGHT: 71 IN

## 2024-01-16 DIAGNOSIS — H57.11 ACUTE RIGHT EYE PAIN: Primary | ICD-10-CM

## 2024-01-16 PROCEDURE — 99283 EMERGENCY DEPT VISIT LOW MDM: CPT

## 2024-01-17 ENCOUNTER — TELEPHONE (OUTPATIENT)
Dept: INTERNAL MEDICINE CLINIC | Facility: CLINIC | Age: 54
End: 2024-01-17

## 2024-01-17 NOTE — ED PROVIDER NOTES
Patient Seen in: [facility] In [place]      History     Chief Complaint   Patient presents with    Eye Visual Problem     Stated Complaint: R eye pain and FB sensation for 3 days    Subjective:   HPI    53-year-old female [place] presenting to the emerged part for eye pain.  For the last 3 days she has a foreign body sensation and soreness underneath the right eyelid itself she denies any contact lens usage currently she denies any sort of discharge from the eye swelling pain when moving the eye history of trauma or any other exacerbating factors or associated symptoms    Objective:   Past Medical History:   Diagnosis Date    Anemia     Arthritis 2023    Knee    Bloating     Brain mass 2009    Reported by patient    Chest pain on exertion     Dizziness     Ectopic pregnancy     Exposure to TB 1991    Negative xray    Fatigue     Flatulence/gas pain/belching     Headache disorder     Heartburn     High cholesterol     Irregular bowel habits     Leg swelling     Lipid screening 4/14/2012    Loss of appetite     Meningioma (HCC) 2008    Migraines     Nausea     Normal delivery 1990,1993,1997,2003    x4    Other, multiple and ill-defined closed fractures of lower limb as a child    Rt foot    Stress 2020    Unspecified ectopic pregnancy without intrauterine pregnancy     Wears glasses 1989              Past Surgical History:   Procedure Laterality Date    COLONOSCOPY N/A 9/23/2022    Procedure: COLONOSCOPY;  Surgeon: Brandyn Bridges DO;  Location:  ENDOSCOPY    HERNIA SURGERY  7/14/2008    repair    HYSTERECTOMY  12/15/13    OTHER  Sep 22/2016    Brain tumor- Meningioma removal     OTHER SURGICAL HISTORY      salpingectomy-rt tube removed for ectopic pregnancy    REPAIR ING HERNIA,5+Y/O,REDUCIBL      TOTAL ABDOM HYSTERECTOMY      TUBAL LIGATION  2003                Social History     Socioeconomic History    Marital status: Single   Tobacco Use    Smoking status: Never     Smokeless tobacco: Never   Vaping Use    Vaping Use: Never used   Substance and Sexual Activity    Alcohol use: Not Currently     Alcohol/week: 0.0 standard drinks of alcohol     Comment: occ    Drug use: No   Other Topics Concern    Caffeine Concern Yes     Comment: coffee 1-2 times/week    Exercise No              Review of Systems    Positive for stated complaint: R eye pain and FB sensation for 3 days  Other systems are as noted in HPI.  Constitutional and vital signs reviewed.      All other systems reviewed and negative except as noted above.    Physical Exam     ED Triage Vitals [01/16/24 2253]   BP (!) 137/98   Pulse 76   Resp 16   Temp    Temp src    SpO2 98 %   O2 Device None (Room air)       Current:BP (!) 137/98   Pulse 76   Resp 16   Ht 180.3 cm (5' 11\")   Wt (!) 143.8 kg   LMP 10/02/2013   SpO2 98%   BMI 44.21 kg/m²     Right Eye Chart Acuity: 20/13, Corrected  Left Eye Chart Acuity: 20/13, Corrected    Physical Exam  Awake alert patient appears no distress HEENT exam normal lungs normal cardiovascular exam normal abdomen normal extremities no clubbing cyanosis or edema cranial nerves II through XII intact normal gait no pain or consensual light exam Red reflex present visual acuity noted visual fields intact extraocular was intact external exam no matting of the eyelids by history or by physical exam lid margins look healthy normal lids and eversion for exam showing no signs of foreign bodies no fluorescein uptake noted.  No erythema no rash       ED Course   Labs Reviewed - No data to display          Differential diagnosis includes herpes infection, corneal abrasion         MDM                                         Medical Decision Making  53-year-old female presenting with right eye pain I see no signs of infection no signs of abscess no signs of abrasion or globe perforation.  There is no pain on consensual light exam right pupil reflex present the patient is noted to have a pupil that is  3 mm and reactive patient does have an eye doctor that she will follow-up with by the end of the week when she returns back to [place] she is to return emerged part worsening symptoms with complaints  The patient was screened and evaluated during this visit.  As a treating physician attending to the patient, I determined, within reasonable clinical confidence and prior to discharge, that an emergency medical condition was not or was no longer present.  There was no indication for further evaluation, treatment or admission on an emergency basis.    The usual and customary discharge instructions were discussed given the patient's ER course.  We discussed signs and symptoms that should prompt the patient's immediate return to the emergency department.  Reasonable over-the-counter and prescription treatment options and physician follow-up plan was discussed.  Patient was discharged home in good condition  This note was prepared using Dragon Medical voice recognition dictation software.  As a result errors may occur.  When identified to these areas have been corrected.  While every attempt is made to correct errors during dictation discrepancies may still exist.  Please contact if there are any errors    Problems Addressed:  Acute right eye pain: acute illness or injury        Disposition and Plan     Clinical Impression:  1. Acute right eye pain         Disposition:  Discharge  1/16/2024 11:06 pm    Follow-up:  No follow-up provider specified.        Medications Prescribed:  Current Discharge Medication List

## 2024-01-17 NOTE — TELEPHONE ENCOUNTER
PRATEEKI for appt tomorrow:     Spoke w pt.   Pt was seen yesterday at ER for right eye pain. Pt stated yesterday she did not have any draining/crusting. She stated ER doc told her it might be the start of conjunctivitis but did not prescribe anything.     This morning pt woke up with right eye crusty/draining. Using warm compresses.   Advised pt to set up appt to be re-evaluated.     Future Appointments   Date Time Provider Department Center   1/18/2024  8:00 AM Chyna Loredo APRN [facility]

## 2024-01-17 NOTE — TELEPHONE ENCOUNTER
Patient called to ask if she needs appointment.    Patient went to ED on 1/16 for right eye pain and was informed possibly early stages of conjunctivitis.  States she woke up today with worsening symptoms - crusty eyes and drainage, no other symptoms.    Please advise.

## 2024-01-18 ENCOUNTER — OFFICE VISIT (OUTPATIENT)
Dept: INTERNAL MEDICINE CLINIC | Facility: CLINIC | Age: 54
End: 2024-01-18
Payer: COMMERCIAL

## 2024-01-18 VITALS
BODY MASS INDEX: 41.02 KG/M2 | RESPIRATION RATE: 20 BRPM | TEMPERATURE: 98 F | WEIGHT: 293 LBS | SYSTOLIC BLOOD PRESSURE: 126 MMHG | HEART RATE: 84 BPM | OXYGEN SATURATION: 98 % | HEIGHT: 71 IN | DIASTOLIC BLOOD PRESSURE: 78 MMHG

## 2024-01-18 DIAGNOSIS — H10.31 ACUTE BACTERIAL CONJUNCTIVITIS OF RIGHT EYE: Primary | ICD-10-CM

## 2024-01-18 PROCEDURE — 3074F SYST BP LT 130 MM HG: CPT | Performed by: FAMILY MEDICINE

## 2024-01-18 PROCEDURE — 99213 OFFICE O/P EST LOW 20 MIN: CPT | Performed by: FAMILY MEDICINE

## 2024-01-18 PROCEDURE — 3008F BODY MASS INDEX DOCD: CPT | Performed by: FAMILY MEDICINE

## 2024-01-18 PROCEDURE — 3078F DIAST BP <80 MM HG: CPT | Performed by: FAMILY MEDICINE

## 2024-01-18 RX ORDER — GENTAMICIN SULFATE 3 MG/ML
2 SOLUTION/ DROPS OPHTHALMIC 3 TIMES DAILY
Qty: 5 ML | Refills: 0 | Status: SHIPPED | OUTPATIENT
Start: 2024-01-18 | End: 2024-01-25

## 2024-01-18 RX ORDER — FREMANEZUMAB-VFRM 225 MG/1.5ML
225 INJECTION SUBCUTANEOUS
COMMUNITY
Start: 2023-09-12

## 2024-01-18 NOTE — PROGRESS NOTES
CHIEF COMPLAINT:     Chief Complaint   Patient presents with    Conjunctivitis     R eye, night time it drains and gets crusted. Has been using warm compresses. Pain is much better.       HPI:   Meagan Billy is a 53 year old female .  The patient presents for a recheck after ER visit for complaints of right eye pain. Was seen 2 days ago.   History from ER: 53-year-old female [place] presenting to the emerged part for right eye pain.  For the last 3 days she has a foreign body sensation and soreness underneath the right eyelid itself she denies any contact lens usage currently she denies any sort of discharge from the eye swelling pain when moving the eye history of trauma or any other exacerbating factors or associated symptoms .   The following tests were done: none. Pt is on the following meds: none. The patient is not having any pain now but has drainage from the right eye and it is crusty.    Current Outpatient Medications   Medication Sig Dispense Refill    AJOVY 225 MG/1.5ML Subcutaneous Solution Prefilled Syringe Inject 225 mg into the skin every 30 (thirty) days.      gentamicin 0.3 % Ophthalmic Solution Place 2 drops into the right eye 3 (three) times daily for 7 days. 5 mL 0    topiramate 100 MG Oral Tab Take 50 mg AM and 100 mg  tablet 0    Naratriptan HCl 2.5 MG Oral Tab Take 1 tablet (2.5 mg total) by mouth as needed for Migraine. Take it as directed 10 tablet 0    ondansetron (ZOFRAN) 4 mg tablet Take 1 tablet (4 mg total) by mouth every 8 (eight) hours as needed for Nausea. 30 tablet 0    rosuvastatin 10 MG Oral Tab Take 1 tablet (10 mg total) by mouth daily. 90 tablet 0    valACYclovir 500 MG Oral Tab Take 1 tablet (500 mg total) by mouth daily. 90 tablet 0    triamterene-hydroCHLOROthiazide 37.5-25 MG Oral Cap Take 1 capsule by mouth every morning. 90 capsule 1    diazePAM 5 MG Oral Tab Take 1 tablet (5 mg total) by mouth daily as needed for Sleep. 30 tablet 0    Cabergoline  0.5 MG Oral Tab       Multiple Vitamins-Minerals (MULTIVITAMIN & MINERAL OR) Take 1 tablet by mouth daily.        Past Medical History:   Diagnosis Date    Anemia     Arthritis 2023    Knee    Bloating     Brain mass 2009    Reported by patient    Chest pain on exertion     Dizziness     Ectopic pregnancy     Exposure to TB 1991    Negative xray    Fatigue     Flatulence/gas pain/belching     Headache disorder     Heartburn     High cholesterol     Irregular bowel habits     Leg swelling     Lipid screening 4/14/2012    Loss of appetite     Meningioma (HCC) 2008    Migraines     Nausea     Normal delivery 1990,1993,1997,2003    x4    Other, multiple and ill-defined closed fractures of lower limb as a child    Rt foot    Stress 2020    Unspecified ectopic pregnancy without intrauterine pregnancy     Wears glasses 1989      Social History:  Social History     Socioeconomic History    Marital status: Single   Tobacco Use    Smoking status: Never    Smokeless tobacco: Never   Vaping Use    Vaping Use: Never used   Substance and Sexual Activity    Alcohol use: Not Currently     Alcohol/week: 0.0 standard drinks of alcohol     Comment: occ    Drug use: No   Other Topics Concern    Caffeine Concern Yes     Comment: coffee 1-2 times/week    Exercise No        REVIEW OF SYSTEMS:   GENERAL: Denies fever, chills,weight change, decreased appetite  SKIN: Denies rashes, skin wounds or ulcers.  EYES: Denies blurred vision or double vision,see HPI  HENT: Denies congestion, rhinorrhea, sore throat or ear pain  CHEST: Denies chest pain, or palpitations  LUNGS: Denies shortness of breath, cough, or wheezing  GI: Denies abdominal pain, N/V/C/D.   MUSCULOSKELETAL: no arthralgia or swollen joints  LYMPH:  Denies lymphadenopathy  NEURO: Denies headaches or lightheadedness      EXAM:   /78 (BP Location: Right arm, Patient Position: Sitting, Cuff Size: large)   Pulse 84   Temp 97.9 °F (36.6 °C) (Temporal)   Resp 20   Ht 5' 11\"  (1.803 m)   Wt (!) 324 lb 3.2 oz (147.1 kg)   LMP 10/02/2013   SpO2 98%   BMI 45.22 kg/m²   GENERAL: well developed, well nourished,in no apparent distress  SKIN: no rashes,no suspicious lesions  HEAD: atraumatic, normocephalic  EYES: Left conjunctiva clear,right conjunctiva redden, small amount purulent drainage noted.  PERRLA  ENT: clear  NECK: supple, non-tender  LUNGS: clear to auscultation bilaterally, no wheezes or rhonchi. Breathing is non labored.  CARDIO: RRR without murmur  LYMPH:  no lymphadenopathy.      Physical Exam    ASSESSMENT AND PLAN:     Encounter Diagnosis   Name Primary?    Acute bacterial conjunctivitis of right eye Yes       No orders of the defined types were placed in this encounter.      Meds & Refills for this Visit:  Requested Prescriptions     Signed Prescriptions Disp Refills    gentamicin 0.3 % Ophthalmic Solution 5 mL 0     Sig: Place 2 drops into the right eye 3 (three) times daily for 7 days.       Imaging & Consults:  None    PLAN:  It is very important to finish full course of antibiotics.   Avoid touching eyes.   Good handwashing as conjunctivitis is very contagious.    Warm compresses to affected eye as needed.  Change pillowcase out every day while on the eye drops  To use a separate towel, wash cloth daily  If visual changes or severe pain, Pt advised to go to the ER to be assessed.          The patient indicates understanding of these issues and agrees to the plan.  The patient is asked to return if not improving.

## 2024-07-06 DIAGNOSIS — F41.9 ANXIETY: ICD-10-CM

## 2024-07-06 DIAGNOSIS — B00.9 HERPES SIMPLEX VIRUS (HSV) INFECTION: ICD-10-CM

## 2024-07-06 DIAGNOSIS — E78.00 PURE HYPERCHOLESTEROLEMIA: ICD-10-CM

## 2024-07-06 DIAGNOSIS — G43.709 CHRONIC MIGRAINE W/O AURA W/O STATUS MIGRAINOSUS, NOT INTRACTABLE: ICD-10-CM

## 2024-07-07 RX ORDER — ROSUVASTATIN CALCIUM 10 MG/1
10 TABLET, COATED ORAL DAILY
Qty: 90 TABLET | Refills: 0 | Status: SHIPPED | OUTPATIENT
Start: 2024-07-07

## 2024-07-07 RX ORDER — VALACYCLOVIR HYDROCHLORIDE 500 MG/1
500 TABLET, FILM COATED ORAL DAILY
Qty: 90 TABLET | Refills: 0 | Status: SHIPPED | OUTPATIENT
Start: 2024-07-07

## 2024-07-08 RX ORDER — DIAZEPAM 5 MG/1
5 TABLET ORAL DAILY PRN
Qty: 30 TABLET | Refills: 0 | Status: SHIPPED | OUTPATIENT
Start: 2024-07-08

## 2024-07-08 RX ORDER — NARATRIPTAN 2.5 MG/1
2.5 TABLET ORAL AS NEEDED
Qty: 10 TABLET | Refills: 1 | Status: SHIPPED | OUTPATIENT
Start: 2024-07-08

## 2024-07-08 NOTE — TELEPHONE ENCOUNTER
Medication: Naratriptan HCl 2.5 MG Oral Tab      Date of last refill: 11/28/2023 (#10/0)  [place]     Last office visit: 06/19/2023  Due back to clinic per last office note:  6 months   Date next office visit scheduled:    Future Appointments   Date Time Provider Department Center   8/29/2024  3:20 PM Lester Araya MD [facility]           Last OV note recommendation:    Assessment  ASSESSMENT/PLAN:      (G43.709) Chronic migraine w/o aura w/o status migrainosus, not intractable  (primary encounter diagnosis)     Stable overall     Continue  Ajovy for prevention  Continue naratriptan  Continue Topamax 50 mg AM and 100 mg PM        Pituitary adenoma- on Cabergoline  Follows with [facility]        Follow up in about 6 month  See orders and medications filed with this encounter. The patient indicates understanding of these issues and agrees with the plan.        No orders of the defined types were placed in this encounter.

## 2024-07-08 NOTE — TELEPHONE ENCOUNTER
Requesting    diazePAM 5 MG Oral Tab         Sig: Take 1 tablet (5 mg total) by mouth daily as needed for Sleep.    Disp: 30 tablet    Refills: 0    Start: 7/6/2024    Class: Normal    Non-formulary For: Anxiety    To pharmacy: Not to exceed 5 additional fills before 09/14/2023    Last ordered: 11 months ago (7/19/2023) by Marcos Valles MD    Controlled Substance Medication [id number]/2024 02:40 AM    This medication is a controlled substance - forward to provider to refill        LOV: 1/18/2024  RTC: none noted   Last Relevant Labs: n/a   Filled: 7/19/2023 #30 with 0 refills    Future Appointments   Date Time Provider Department Center   8/29/2024  3:20 PM Lester Araya MD [facility]

## 2024-07-10 ENCOUNTER — TELEPHONE (OUTPATIENT)
Dept: NEUROLOGY | Facility: CLINIC | Age: 54
End: 2024-07-10

## 2024-07-10 DIAGNOSIS — G43.709 CHRONIC MIGRAINE W/O AURA W/O STATUS MIGRAINOSUS, NOT INTRACTABLE: ICD-10-CM

## 2024-07-10 RX ORDER — TOPIRAMATE 100 MG/1
TABLET, FILM COATED ORAL
Qty: 135 TABLET | Refills: 0 | OUTPATIENT
Start: 2024-07-10

## 2024-07-10 NOTE — TELEPHONE ENCOUNTER
Spoke with patient who state she does not need Topiramate refill at this time. She is currently living in [place] and plans to continue seeing Dr Araya (has xavi't on 8/29) but she had to get established with an [place] Neurologist in order to get prescriptions filled in [place].

## 2024-07-19 ENCOUNTER — TELEPHONE (OUTPATIENT)
Dept: NEUROLOGY | Facility: CLINIC | Age: 54
End: 2024-07-19

## 2024-07-19 NOTE — TELEPHONE ENCOUNTER
Received fax from [employer] for Prior Authorization on Ajovy    Medication not recently ordered.    Completed Prior Authorization through [employer]  Key [id number]    All questions answered. Did not send last office visit. Patient has not been seen recently.

## 2024-07-22 DIAGNOSIS — G43.709 CHRONIC MIGRAINE W/O AURA W/O STATUS MIGRAINOSUS, NOT INTRACTABLE: ICD-10-CM

## 2024-07-22 RX ORDER — TOPIRAMATE 100 MG/1
TABLET, FILM COATED ORAL
Qty: 135 TABLET | Refills: 0 | Status: SHIPPED | OUTPATIENT
Start: 2024-07-22

## 2024-07-22 NOTE — TELEPHONE ENCOUNTER
Medication: topiramate 100 MG Oral Tab      Date of last refill: 11/28/23 (#135/0)  [place] 5/812/24     Last office visit: 6/19/23  Due back to clinic per last office note:  6 months  Date next office visit scheduled:    Future Appointments   Date Time Provider Department Center   8/29/2024  3:20 PM Lester Araya MD [facility]           Last OV note recommendation:      Continue  Ajovy for prevention  Continue naratriptan  Continue Topamax 50 mg AM and 100 mg PM        Pituitary adenoma- on Cabergoline  Follows with [facility]        Follow up in about 6 month  
[phone]/yes

## 2024-08-29 ENCOUNTER — HOSPITAL ENCOUNTER (OUTPATIENT)
Dept: MAMMOGRAPHY | Age: 54
Discharge: HOME | End: 2024-08-29
Attending: FAMILY MEDICINE
Payer: COMMERCIAL

## 2024-08-29 ENCOUNTER — OFFICE VISIT (OUTPATIENT)
Dept: NEUROLOGY | Facility: CLINIC | Age: 54
End: 2024-08-29
Payer: COMMERCIAL

## 2024-08-29 VITALS
RESPIRATION RATE: 16 BRPM | SYSTOLIC BLOOD PRESSURE: 128 MMHG | BODY MASS INDEX: 46 KG/M2 | DIASTOLIC BLOOD PRESSURE: 74 MMHG | WEIGHT: 293 LBS | HEART RATE: 89 BPM

## 2024-08-29 DIAGNOSIS — G43.709 CHRONIC MIGRAINE W/O AURA W/O STATUS MIGRAINOSUS, NOT INTRACTABLE: Primary | ICD-10-CM

## 2024-08-29 DIAGNOSIS — Z12.31 ENCOUNTER FOR SCREENING MAMMOGRAM FOR BREAST CANCER: ICD-10-CM

## 2024-08-29 PROCEDURE — 77063 BREAST TOMOSYNTHESIS BI: CPT | Performed by: FAMILY MEDICINE

## 2024-08-29 PROCEDURE — 99213 OFFICE O/P EST LOW 20 MIN: CPT | Performed by: OTHER

## 2024-08-29 PROCEDURE — 77067 SCR MAMMO BI INCL CAD: CPT | Performed by: FAMILY MEDICINE

## 2024-08-29 RX ORDER — FREMANEZUMAB-VFRM 225 MG/1.5ML
225 INJECTION SUBCUTANEOUS
Qty: 1 ML | Refills: 5 | Status: SHIPPED | OUTPATIENT
Start: 2024-08-29

## 2024-08-29 NOTE — PATIENT INSTRUCTIONS
Refill policies:    Allow 2-3 business days for refills; controlled substances may take longer.  Contact your pharmacy at least 5 days prior to running out of medication and have them send an electronic request or submit request through the “request refill” option in your [employer] account.  Refills are not addressed on weekends; covering physicians do not authorize routine medications on weekends.  No narcotics or controlled substances are refilled after noon on Fridays or by on call physicians.  By law, narcotics must be electronically prescribed.  A 30 day supply with no refills is the maximum allowed.  If your prescription is due for a refill, you may be due for a follow up appointment.  To best provide you care, patients receiving routine medications need to be seen at least once a year.  Patients receiving narcotic/controlled substance medications need to be seen at least once every 3 months.  In the event that your preferred pharmacy does not have the requested medication in stock (e.g. Backordered), it is your responsibility to find another pharmacy that has the requested medication available.  We will gladly send a new prescription to that pharmacy at your request.    Scheduling Tests:    If your physician has ordered radiology tests such as MRI or CT scans, please contact Central Scheduling at [phone] right away to schedule the test.  Once scheduled, the [facility] Centralized Referral Team will work with your insurance carrier to obtain pre-certification or prior authorization.  Depending on your insurance carrier, approval may take 3-10 days.  It is highly recommended patients assure they have received an authorization before having a test performed.  If test is done without insurance authorization, patient may be responsible for the entire amount billed.      Precertification and Prior Authorizations:  If your physician has recommended that you have a procedure or additional testing performed the [facility]  Centralized Referral Team will contact your insurance carrier to obtain pre-certification or prior authorization.    You are strongly encouraged to contact your insurance carrier to verify that your procedure/test has been approved and is a COVERED benefit.  Although the [facility] Centralized Referral Team does its due diligence, the insurance carrier gives the disclaimer that \"Although the procedure is authorized, this does not guarantee payment.\"    Ultimately the patient is responsible for payment.   Thank you for your understanding in this matter.  Paperwork Completion:  If you require FMLA or disability paperwork for your recovery, please make sure to either drop it off or have it faxed to our office at [phone]. Be sure the form has your name and date of birth on it.  The form will be faxed to our Forms Department and they will complete it for you.  There is a 25$ fee for all forms that need to be filled out.  Please be aware there is a 10-14 day turnaround time.  You will need to sign a release of information (SANDRA) form if your paperwork does not come with one.  You may call the Forms Department with any questions at [phone].  Their fax number is [phone].

## 2024-08-29 NOTE — PROGRESS NOTES
HPI:    Patient ID: Meagan Billy is a 54 year old female.    Neurologic Problem  Associated symptoms include headaches.   Migraine        Patient is a 54 year old female who presents for follow up for migraines.  States headaches has  improved. She changed job still with [employer] but working remotely  She moved with her daughter in [place] but visits [place] frequently has family here and wants to continue her medical care here. Reports just completely MRI pituitary, still follows with [facility] for pituitary adenoma      Meagan Billy is a 52 year old female who presented for migraine follow up.  States Sammie is working well and had no major headaches states she states that has been relatively stable and that is most helpful for her headaches.  States that for the past 2 injection had some defective autoinjectors and she informed the pharmaceutical company and got replacement.  She states naratriptan works better than rizatriptan    She follows with Dr. Marks at [facility] and last MRI was a year ago  Lab repeated by endocrinology shows elevated prolactin level and her dose of cabergoline was increased      MRI pituitary w and wo contrast  1. No acute intracranial abnormality identified.       2. Redemonstration of a T2 hyperintense hyperenhancing focus along the anterior aspect of the pituitary gland extending slightly to the left midline.  This area has a gross reference measurement of approximately 5 x 2 x 4 mm, previously 5 x 2 x 6 mm.  A   2nd subtle hypoenhancing focus is seen along the undersurface of the mid aspect of the anterior pituitary gland measuring approximately 5 x 2 x 3 mm, previously 5 x 3 x 5 mm.  The overall findings could represent changes related to a microadenoma with   other etiologies not entirely excluded.  Clinical correlation and continued follow-up is suggested.       3. There are postoperative changes from a previous left parietal craniotomy with dural thickening and enhancement  subjacent to the craniotomy site that is most likely postoperative.  There is no evidence of recurrent/residual meningioma along the   operative bed.       HISTORY:   Past Medical History:    Anemia    Arthritis    Knee    Bloating    Brain mass    Reported by patient    Chest pain on exertion    Dizziness    Ectopic pregnancy (HCC)    Exposure to TB    Negative xray    Fatigue    Flatulence/gas pain/belching    Headache disorder    Heartburn    High cholesterol    Irregular bowel habits    Leg swelling    Lipid screening    Loss of appetite    Meningioma (HCC)    Migraines    Nausea    Normal delivery (HCC)    x4    Other, multiple and ill-defined closed fractures of lower limb    Rt foot    Stress    Unspecified ectopic pregnancy without intrauterine pregnancy (HCC)    Wears glasses      Past Surgical History:   Procedure Laterality Date    Colonoscopy N/A 9/23/2022    Procedure: COLONOSCOPY;  Surgeon: Brandyn Bridges DO;  Location:  ENDOSCOPY    Hernia surgery  7/14/2008    repair    Hysterectomy  12/15/13    Other  Sep 22/2016    Brain tumor- Meningioma removal     Other surgical history      salpingectomy-rt tube removed for ectopic pregnancy    Repair ing hernia,5+y/o,reducibl      Total abdom hysterectomy      Tubal ligation  2003      Family History   Problem Relation Age of Onset    Other (colon ca[other]) Father     Cancer Father     Colon Cancer Father     Heart Disease Mother     High Blood Pressure Mother     Stroke Mother     Lung Disorder Brother     Cancer Brother     Other (colon cancer[other]) Other         uncle    Breast Cancer Paternal Grandmother         unknown age       Social History     Socioeconomic History    Marital status: Single   Tobacco Use    Smoking status: Never    Smokeless tobacco: Never   Vaping Use    Vaping status: Never Used   Substance and Sexual Activity    Alcohol use: Not Currently     Alcohol/week: 0.0 standard drinks of alcohol     Comment: occ    Drug use: No    Other Topics Concern    Caffeine Concern Yes     Comment: coffee 1-2 times/week    Exercise No          Review of Systems   Constitutional: Negative.    HENT: Negative.     Eyes: Negative.    Respiratory: Negative.     Cardiovascular: Negative.    Gastrointestinal: Negative.    Endocrine: Negative.    Genitourinary: Negative.    Musculoskeletal: Negative.    Neurological:  Positive for headaches.   All other systems reviewed and are negative.           Current Outpatient Medications   Medication Sig Dispense Refill    topiramate 100 MG Oral Tab Take 50 mg AM and 100 mg  tablet 0    Naratriptan HCl 2.5 MG Oral Tab Take 1 tablet (2.5 mg total) by mouth as needed for Migraine. Take it as directed 10 tablet 1    valACYclovir 500 MG Oral Tab Take 1 tablet (500 mg total) by mouth daily. 90 tablet 0    rosuvastatin 10 MG Oral Tab Take 1 tablet (10 mg total) by mouth daily. 90 tablet 0    AJOVY 225 MG/1.5ML Subcutaneous Solution Prefilled Syringe Inject 225 mg into the skin every 30 (thirty) days.      ondansetron (ZOFRAN) 4 mg tablet Take 1 tablet (4 mg total) by mouth every 8 (eight) hours as needed for Nausea. 30 tablet 0    triamterene-hydroCHLOROthiazide 37.5-25 MG Oral Cap Take 1 capsule by mouth every morning. 90 capsule 1    Cabergoline 0.5 MG Oral Tab       Multiple Vitamins-Minerals (MULTIVITAMIN & MINERAL OR) Take 1 tablet by mouth daily.      diazePAM 5 MG Oral Tab Take 1 tablet (5 mg total) by mouth daily as needed for Sleep. (Patient not taking: Reported on 8/29/2024) 30 tablet 0     Allergies:  Allergies   Allergen Reactions    Emgality [Galcanezumab-Gnlm] SHORTNESS OF BREATH      PHYSICAL EXAM:   Physical Exam  Blood pressure 128/74, pulse 89, resp. rate 16, weight (!) 328 lb (148.8 kg), last menstrual period 10/02/2013, not currently breastfeeding.      General: A 54  year old female in no apparent distress  Head: Normocephalic and atraumatic.   Cardiovascular: Normal rate, regular rhythm and normal  heart sounds.    Pulmonary/Chest: Effort normal and breath sounds normal.   Abdominal: Soft. Bowel sounds are normal.   Skin: Skin is warm and dry.   Psychiatric:normal mood and affect.   NEUROLOGICAL: This patient is alert and orientated x 3. Speech is fluent with intact comprehension.   Pupils equally round and reactive to light. 3+ brisk bilaterally. EOMs intact. Visual fields are full. Face is symmetrical. Tongue is midline. Uvula and palate elevate symmetrically. Shrug shoulders normally bilaterally. The rest of the cranial nerves are grossly intact.   Sensation to light touch is intact bilaterally.   Motor: Normal tone. No arm or leg weakness noted, strength approximately 5/5 bilaterally. Finger-to-nose coordination is intact.   Gait brisk and steady.            ASSESSMENT/PLAN:     (G43.429) Chronic migraine w/o aura w/o status migrainosus, not intractable  (primary encounter diagnosis)    Stable overall    Continue  Ajovy for prevention  Continue naratriptan  Continue Topamax 50 mg AM and 100 mg PM      Pituitary adenoma- on Cabergoline  Follows with [facility]      Follow up in about 12 months or sooner if necessary  See orders and medications filed with this encounter. The patient indicates understanding of these issues and agrees with the plan.      No orders of the defined types were placed in this encounter.      Meds This Visit:  Requested Prescriptions      No prescriptions requested or ordered in this encounter       Imaging & Referrals:  None       ID#[id number]

## 2024-10-01 DIAGNOSIS — R60.0 LOCALIZED EDEMA: ICD-10-CM

## 2024-10-01 RX ORDER — TRIAMTERENE AND HYDROCHLOROTHIAZIDE 37.5; 25 MG/1; MG/1
1 CAPSULE ORAL EVERY MORNING
Qty: 90 CAPSULE | Refills: 1 | Status: CANCELLED | OUTPATIENT
Start: 2024-10-01

## 2024-10-01 RX ORDER — TRIAMTERENE AND HYDROCHLOROTHIAZIDE 37.5; 25 MG/1; MG/1
1 CAPSULE ORAL EVERY MORNING
Qty: 30 CAPSULE | Refills: 0 | OUTPATIENT
Start: 2024-10-01

## 2024-10-01 NOTE — TELEPHONE ENCOUNTER
Triamterene-HCTZ 37.5-25 MG Oral Capsule          Will file in chart as: TRIAMTERENE-HYDROCHLOROTHIAZIDE 37.5-25 MG Oral Cap     Possible duplicate: Hover to review recent actions on this medication    Sig: Take 1 capsule by mouth once daily in the morning    Disp: 30 capsule    Refills: 0    Start: 10/1/2024    Class: Normal    Non-formulary For: Localized edema    Last ordered: 1 year ago (9/16/2023) by LUIS Smith    Last refill: 7/31/2024    Rx #: [id number]    Hypertension Medications Protocol [id number]/01/2024 03:28 PM   Protocol Details CMP or BMP in past 12 months    EGFRCR or GFRAA > 50    Last BP reading less than 140/90    In person appointment or virtual visit in the past 12 mos or appointment in next 3 mos      LOV 1/18/24  Filled 9/16/23 90 caps 1 refill   Last labs 9/16/23  Future Appointments   Date Time Provider Department Center   10/19/2024  9:30 AM Chyna Loredo APRN [facility]

## 2024-10-29 ENCOUNTER — PATIENT MESSAGE (OUTPATIENT)
Dept: NEUROLOGY | Facility: CLINIC | Age: 54
End: 2024-10-29

## 2024-10-31 ENCOUNTER — TELEPHONE (OUTPATIENT)
Dept: SURGERY | Facility: CLINIC | Age: 54
End: 2024-10-31

## 2024-10-31 NOTE — TELEPHONE ENCOUNTER
Family Medical Leave Act forms received via Cell>Point message on 10/29/24. Cell[employer] message sent for authorization. Logged for processing.

## 2024-11-01 NOTE — TELEPHONE ENCOUNTER
Patient replied to Release of Information for authorization. That what's completed forms uploaded to my chart.

## 2024-11-04 NOTE — TELEPHONE ENCOUNTER
Duplicate FMLA forms rcvd from [facility] for Dr Araya in Forms Dept. Forms placed in archive.

## 2024-11-12 NOTE — TELEPHONE ENCOUNTER
Type of Leave: Intermittent  Reason for Leave: Migraines  Start date of leave: 11/1/24 to 11/1/25  End date of leave:  How many flare ups per month/length?:3-4 flare ups/mo lasting 1-3 days  How many appts per month/length?:   Was Fee and Turnaround info Given?:

## 2024-11-12 NOTE — TELEPHONE ENCOUNTER
Dr. Araya,    Please sign off on form if you agree to: Intermittent Family Medical Leave Act from 11/1/24 to11/1/25 due to migraines, 3-4 flare ups/mo lasting 3-4 days    -Signature page will be the first page scanned  -From your Inbasket, Highlight the patient and click Chart   -Double click the 10/31/24 Forms Completion telephone encounter  -Scroll down to the Media section   -Click the blue Hyperlink:DAVIDA, Dr. Araya, 11/13/24    -Click Acknowledge located in the top right ribbon/menu   -Drag the mouse into the blank space of the document and a + sign will appear. Left click to   electronically sign the document.  -Once signed, simply exit out of the screen and you signature will be saved.     Thank you,  Tia BABIN

## 2024-11-19 NOTE — TELEPHONE ENCOUNTER
Forms completed and uploaded to [facility] due to no Release of Information on file.

## 2024-12-30 ENCOUNTER — PATIENT MESSAGE (OUTPATIENT)
Dept: INTERNAL MEDICINE CLINIC | Facility: CLINIC | Age: 54
End: 2024-12-30

## 2025-03-29 DIAGNOSIS — R60.0 LOCALIZED EDEMA: ICD-10-CM

## 2025-03-29 DIAGNOSIS — G43.709 CHRONIC MIGRAINE W/O AURA W/O STATUS MIGRAINOSUS, NOT INTRACTABLE: ICD-10-CM

## 2025-03-29 RX ORDER — TRIAMTERENE AND HYDROCHLOROTHIAZIDE 37.5; 25 MG/1; MG/1
1 CAPSULE ORAL EVERY MORNING
Qty: 90 CAPSULE | Refills: 1 | OUTPATIENT
Start: 2025-03-29

## 2025-03-31 RX ORDER — FREMANEZUMAB-VFRM 225 MG/1.5ML
225 INJECTION SUBCUTANEOUS
Qty: 1 ML | Refills: 5 | Status: SHIPPED | OUTPATIENT
Start: 2025-03-31

## 2025-03-31 NOTE — TELEPHONE ENCOUNTER
Medication: AJOVY 225 MG/1.5ML Subcutaneous Solution Prefilled Syringe      Date of last refill: 08/29/2024 (#1 mL/5)  [place]     Last office visit: 08/29/2024  Due back to clinic per last office note:  Around 08/29/2025  Date next office visit scheduled:    Future Appointments   Date Time Provider Department Center   5/19/2025  8:00 AM Chyna Loredo APRN [facility]           Last OV note recommendation:    Assessment  ASSESSMENT/PLAN:      (G43.709) Chronic migraine w/o aura w/o status migrainosus, not intractable  (primary encounter diagnosis)     Stable overall     Continue  Ajovy for prevention  Continue naratriptan  Continue Topamax 50 mg AM and 100 mg PM        Pituitary adenoma- on Cabergoline  Follows with [facility]        Follow up in about 12 months or sooner if necessary  See orders and medications filed with this encounter. The patient indicates understanding of these issues and agrees with the plan.        No orders of the defined types were placed in this encounter.

## 2025-05-17 NOTE — PROGRESS NOTES
HPI:   Meagan Billy is a 54 year old female who presents for a complete physical exam. Symptoms: denies discharge, itching, burning or dysuria, is menopausal.   Regular SBE- yes,Sexually active- yes,occ,  Contraception- hysterectomy. STD history- none    Patient c/o feeling really tired,exhausted at times. Pt has noticed in the afternoon,early evening her BP will drop to the 90's/ 50-60's. Pt states she will get occ dizzy, but more tired. Pt has a hx of being anemic when she use to have her menses.    Screening:  Immunization History   Administered Date(s) Administered    Covid-19 Vaccine Pfizer 30 mcg/0.3 ml 02/04/2021, 02/25/2021    FLULAVAL 6 months & older 0.5 ml Prefilled syringe (22634) 10/07/2017    FLUZONE 6 months and older PFS 0.5 ml (45957) 10/07/2017    HEP B 01/02/2001    Influenza 09/23/2016, 11/16/2020, 11/02/2021, 11/10/2023    TDAP 08/23/2011, 09/06/2022    Zoster Vaccine Recombinant Adjuvanted (Shingrix) 09/06/2022, 09/16/2023      Menarche- 15 yr  PAP- 2013 hysterectomy  Any abnormal pap smears- none  Pregnancies- 6, Live births- 4  Mammogram-  8/29/24   Any breast cancer- paternal grandmother, or any gynecological cancer- none, any cancers dad- colon and prostrate cancer. Brother- throat cancer- smoker, and brother lung- smoker   Labs- 9/16/23  DEXA over 65yr- n/a  Flu shot-  none, will get this fall  Colon- 9/23/22  Glasses/contacts- yes, last exam- 1/2025  Dental visits- 2023, has an appt scheduled    Immunization History   Administered Date(s) Administered    Covid-19 Vaccine Pfizer 30 mcg/0.3 ml 02/04/2021, 02/25/2021    FLULAVAL 6 months & older 0.5 ml Prefilled syringe (66997) 10/07/2017    FLUZONE 6 months and older PFS 0.5 ml (88855) 10/07/2017    HEP B 01/02/2001    Influenza 09/23/2016, 11/16/2020, 11/02/2021, 11/10/2023    TDAP 08/23/2011, 09/06/2022    Zoster Vaccine Recombinant Adjuvanted (Shingrix) 09/06/2022, 09/16/2023     Wt Readings from Last 6 Encounters:   05/19/25 (!) 319 lb  6.4 oz (144.9 kg)   08/29/24 (!) 328 lb (148.8 kg)   01/18/24 (!) 324 lb 3.2 oz (147.1 kg)   01/16/24 (!) 317 lb (143.8 kg)   09/16/23 (!) 329 lb 9.6 oz (149.5 kg)   06/19/23 (!) 314 lb (142.4 kg)     Body mass index is 44.55 kg/m².     Lab Results   Component Value Date    GLU 91 09/16/2023    GLU 95 06/10/2022    GLU 94 05/21/2022     Lab Results   Component Value Date    CHOLEST 142 09/16/2023    CHOLEST 241 (H) 06/10/2022    CHOLEST 267 (H) 11/06/2021     Lab Results   Component Value Date    HDL 49 09/16/2023    HDL 50 06/10/2022    HDL 54 11/06/2021     Lab Results   Component Value Date    LDL 76 09/16/2023     (H) 06/10/2022     (H) 11/06/2021     Lab Results   Component Value Date    AST 24 09/16/2023    AST 14 (L) 06/10/2022    AST 22 05/21/2022     Lab Results   Component Value Date    ALT 26 09/16/2023    ALT 17 06/10/2022    ALT 19 05/21/2022       Current Medications[1]   Past Medical History[2]   Past Surgical History[3]   Family History[4]   Social History:   Short Social Hx on File[5]  Occ: [employer]. : yes. Children: 4.   Exercise: walking.  Diet: avoids bread, no added salt     REVIEW OF SYSTEMS:   GENERAL: feels well otherwise  SKIN: denies any unusual skin lesions  EYES:denies blurred vision or double vision  HEENT: denies nasal congestion, sinus pain or ST  LUNGS: denies shortness of breath with exertion  CARDIOVASCULAR: denies chest pain on exertion  GI: denies abdominal pain,denies heartburn  : denies dysuria, vaginal discharge or itching  MUSCULOSKELETAL: denies back pain  NEURO: + migraine headaches  PSYCHE: +anxiety  HEMATOLOGIC:  hx of anemia  ENDOCRINE: denies thyroid history  ALL/ASTHMA: denies hx of allergy or asthma    EXAM:   /80 (BP Location: Right arm, Patient Position: Sitting, Cuff Size: large)   Pulse 100   Resp 16   Ht 5' 11\" (1.803 m)   Wt (!) 319 lb 6.4 oz (144.9 kg)   LMP 10/02/2013   SpO2 97%   BMI 44.55 kg/m²   Body mass index is 44.55  kg/m².   GENERAL: well developed, well nourished,in no apparent distress  SKIN: no rashes,no suspicious lesions  HEENT: atraumatic, normocephalic,ears and throat are clear  EYES:PERRLA, EOMI, normal optic disk,conjunctiva are clear  NECK: supple,no adenopathy,no bruits  CHEST: no chest tenderness  BREAST: no dominant or suspicious mass  LUNGS: clear to auscultation  CARDIO: RRR without murmur  -EKG shows Normal sinus rhythm with rate of 73, AK and QRS intervals within normal limits, QRS complexes lead III and no T-wave abnormalities.   - Unchanged from previous tracings  GI: good BS's,no masses, HSM or tenderness  :deferred   MUSCULOSKELETAL: back is not tender,FROM of the back  EXTREMITIES: no cyanosis, clubbing or edema  NEURO: Oriented times three,cranial nerves are intact,motor and sensory are grossly intact    ASSESSMENT AND PLAN:   Meagan Billy is a 54 year old female who presents for a complete physical exam.  Mammogram up to date. Health maintenance, will check fasting labs.. Pt' s weight is Body mass index is 44.55 kg/m²., recommended low fat diet and aerobic exercise 30 minutes three times weekly.  The patient indicates understanding of these issues and agrees to the plan.  The patient is asked to return for CPX in one year.    1. Fatigue, unspecified type  Check labs and get Echocardiogram done  - Vitamin B12  - Vitamin D; Future  - TSH W Reflex To Free T4  - Iron And Tibc [E]  - Folic Acid Serum (Folate)  - CARD ECHO 2D DOPPLER (CPT=93306); Future  - EKG with interpretation and Report -IN OFFICE [73898]    2. Routine general medical examination at a health care facility    - CBC With Differential With Platelet  - Comp Metabolic Panel (14)  - Lipid Panel  - Hemoglobin A1C  - Vitamin D; Future  - TSH W Reflex To Free T4    3. 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 Blood pressure instability  - if worsens to go to the ER  - CARD ECHO  2D DOPPLER (CPT=93306); Future  - EKG with interpretation and Report -IN OFFICE [[id number]]    Encounter Diagnoses   Name Primary?    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)     Blood pressure instability        Orders Placed This Encounter   Procedures    CBC With Differential With Platelet    Comp Metabolic Panel (14)    Lipid Panel    Hemoglobin A1C    Vitamin B12    Vitamin D    TSH W Reflex To Free T4    Iron And Tibc [E]    Folic Acid Serum (Folate)    Prolactin [E]       Meds & Refills for this Visit:  Requested Prescriptions      No prescriptions requested or ordered in this encounter       Imaging & Consults:  ELECTROCARDIOGRAM, COMPLETE  CARD ECHO 2D DOPPLER (CPT=93306)           [1]   Current Outpatient Medications   Medication Sig Dispense Refill    ZEPBOUND 10 MG/0.5ML Subcutaneous Solution Auto-injector Inject 10 mg into the skin once a week.      topiramate 50 MG Oral Tab TAKE 1 TABLET BY MOUTH EVERY MORNING AND 2 EVERY NIGHT AT BEDTIME      AJOVY 225 MG/1.5ML Subcutaneous Solution Prefilled Syringe Inject 225 mg into the skin every 30 (thirty) days. 1 mL 5    topiramate 100 MG Oral Tab Take 50 mg AM and 100 mg  tablet 0    diazePAM 5 MG Oral Tab Take 1 tablet (5 mg total) by mouth daily as needed for Sleep. 30 tablet 0    Naratriptan HCl 2.5 MG Oral Tab Take 1 tablet (2.5 mg total) by mouth as needed for Migraine. Take it as directed 10 tablet 1    valACYclovir 500 MG Oral Tab Take 1 tablet (500 mg total) by mouth daily. 90 tablet 0    rosuvastatin 10 MG Oral Tab Take 1 tablet (10 mg total) by mouth daily. 90 tablet 0    ondansetron (ZOFRAN) 4 mg tablet Take 1 tablet (4 mg total) by mouth every 8 (eight) hours as needed for Nausea. 30 tablet 0    triamterene-hydroCHLOROthiazide 37.5-25 MG Oral Cap Take 1 capsule by mouth every morning. 90 capsule 1    Multiple  Vitamins-Minerals (MULTIVITAMIN & MINERAL OR) Take 1 tablet by mouth daily.      Cabergoline 0.5 MG Oral Tab  (Patient not taking: Reported on 5/19/2025)     [2]   Past Medical History:   Anemia    Arthritis    Knee    Bloating    Brain mass    Reported by patient    Chest pain on exertion    Dizziness    Ectopic pregnancy (HCC)    Exposure to TB    Negative xray    Fatigue    Flatulence/gas pain/belching    Headache disorder    Heartburn    High cholesterol    Irregular bowel habits    Leg swelling    Lipid screening    Loss of appetite    Meningioma (HCC)    Migraines    Nausea    Normal delivery (HCC)    x4    Other, multiple and ill-defined closed fractures of lower limb    Rt foot    Stress    Unspecified ectopic pregnancy without intrauterine pregnancy (HCC)    Wears glasses   [3]   Past Surgical History:  Procedure Laterality Date    Colonoscopy N/A 9/23/2022    Procedure: COLONOSCOPY;  Surgeon: Brandyn Bridges DO;  Location:  ENDOSCOPY    Hernia surgery  7/14/2008    repair    Hysterectomy  12/15/13    Other  Sep 22/2016    Brain tumor- Meningioma removal     Other surgical history      salpingectomy-rt tube removed for ectopic pregnancy    Repair ing hernia,7+y/o,reducibl      Total abdom hysterectomy      Tubal ligation  2003   [4]   Family History  Problem Relation Age of Onset    Other (colon ca[other]) Father     Cancer Father     Colon Cancer Father     Heart Disease Mother     High Blood Pressure Mother     Stroke Mother     Lung Disorder Brother     Cancer Brother     Other (colon cancer[other]) Other         uncle    Breast Cancer Paternal Grandmother         unknown age    [5]   Social History  Socioeconomic History    Marital status: Single   Tobacco Use    Smoking status: Never    Smokeless tobacco: Never   Vaping Use    Vaping status: Never Used   Substance and Sexual Activity    Alcohol use: Not Currently     Alcohol/week: 0.0 standard drinks of alcohol     Comment: occ    Drug use: No    Other Topics Concern    Caffeine Concern Yes     Comment: coffee 1-2 times/week    Exercise No

## 2025-05-19 ENCOUNTER — OFFICE VISIT (OUTPATIENT)
Dept: INTERNAL MEDICINE CLINIC | Facility: CLINIC | Age: 55
End: 2025-05-19
Payer: COMMERCIAL

## 2025-05-19 ENCOUNTER — LAB ENCOUNTER (OUTPATIENT)
Dept: LAB | Age: 55
End: 2025-05-19
Attending: FAMILY MEDICINE
Payer: COMMERCIAL

## 2025-05-19 VITALS
OXYGEN SATURATION: 97 % | RESPIRATION RATE: 16 BRPM | BODY MASS INDEX: 41.02 KG/M2 | DIASTOLIC BLOOD PRESSURE: 80 MMHG | WEIGHT: 293 LBS | SYSTOLIC BLOOD PRESSURE: 112 MMHG | HEIGHT: 71 IN | HEART RATE: 100 BPM

## 2025-05-19 DIAGNOSIS — R53.83 FATIGUE, UNSPECIFIED TYPE: ICD-10-CM

## 2025-05-19 DIAGNOSIS — Z00.00 ROUTINE GENERAL MEDICAL EXAMINATION AT A HEALTH CARE FACILITY: ICD-10-CM

## 2025-05-19 DIAGNOSIS — D35.3 BENIGN NEOPLASM OF PITUITARY GLAND AND CRANIOPHARYNGEAL DUCT (POUCH) (HCC): ICD-10-CM

## 2025-05-19 DIAGNOSIS — D35.2 BENIGN NEOPLASM OF PITUITARY GLAND AND CRANIOPHARYNGEAL DUCT (POUCH) (HCC): ICD-10-CM

## 2025-05-19 DIAGNOSIS — Z00.00 ROUTINE GENERAL MEDICAL EXAMINATION AT A HEALTH CARE FACILITY: Primary | ICD-10-CM

## 2025-05-19 DIAGNOSIS — I99.8 BLOOD PRESSURE INSTABILITY: ICD-10-CM

## 2025-05-19 LAB
ALBUMIN SERPL-MCNC: 4.1 G/DL (ref 3.2–4.8)
ALBUMIN/GLOB SERPL: 1.4 {RATIO} (ref 1–2)
ALP LIVER SERPL-CCNC: 63 U/L (ref 41–108)
ALT SERPL-CCNC: 13 U/L (ref 10–49)
ANION GAP SERPL CALC-SCNC: 5 MMOL/L (ref 0–18)
AST SERPL-CCNC: 20 U/L (ref ?–34)
ATRIAL RATE: 73 BPM
BASOPHILS # BLD AUTO: 0.02 X10(3) UL (ref 0–0.2)
BASOPHILS NFR BLD AUTO: 0.4 %
BILIRUB SERPL-MCNC: 0.4 MG/DL (ref 0.3–1.2)
BUN BLD-MCNC: 12 MG/DL (ref 9–23)
CALCIUM BLD-MCNC: 9.9 MG/DL (ref 8.7–10.6)
CHLORIDE SERPL-SCNC: 106 MMOL/L (ref 98–112)
CHOLEST SERPL-MCNC: 227 MG/DL (ref ?–200)
CO2 SERPL-SCNC: 28 MMOL/L (ref 21–32)
CREAT BLD-MCNC: 0.86 MG/DL (ref 0.55–1.02)
EGFRCR SERPLBLD CKD-EPI 2021: 81 ML/MIN/1.73M2 (ref 60–?)
EOSINOPHIL # BLD AUTO: 0.11 X10(3) UL (ref 0–0.7)
EOSINOPHIL NFR BLD AUTO: 2.2 %
ERYTHROCYTE [DISTWIDTH] IN BLOOD BY AUTOMATED COUNT: 13.1 %
EST. AVERAGE GLUCOSE BLD GHB EST-MCNC: 111 MG/DL (ref 68–126)
FASTING PATIENT LIPID ANSWER: YES
FASTING STATUS PATIENT QL REPORTED: YES
FOLATE SERPL-MCNC: 6.1 NG/ML (ref 5.4–?)
GLOBULIN PLAS-MCNC: 2.9 G/DL (ref 2–3.5)
GLUCOSE BLD-MCNC: 85 MG/DL (ref 70–99)
HBA1C MFR BLD: 5.5 % (ref ?–5.7)
HCT VFR BLD AUTO: 37.3 % (ref 35–48)
HDLC SERPL-MCNC: 50 MG/DL (ref 40–59)
HGB BLD-MCNC: 11.7 G/DL (ref 12–16)
IMM GRANULOCYTES # BLD AUTO: 0.01 X10(3) UL (ref 0–1)
IMM GRANULOCYTES NFR BLD: 0.2 %
IRON SATN MFR SERPL: 26 % (ref 15–50)
IRON SERPL-MCNC: 68 UG/DL (ref 50–170)
LDLC SERPL CALC-MCNC: 161 MG/DL (ref ?–100)
LYMPHOCYTES # BLD AUTO: 2.22 X10(3) UL (ref 1–4)
LYMPHOCYTES NFR BLD AUTO: 44 %
MCH RBC QN AUTO: 27.7 PG (ref 26–34)
MCHC RBC AUTO-ENTMCNC: 31.4 G/DL (ref 31–37)
MCV RBC AUTO: 88.4 FL (ref 80–100)
MONOCYTES # BLD AUTO: 0.39 X10(3) UL (ref 0.1–1)
MONOCYTES NFR BLD AUTO: 7.7 %
NEUTROPHILS # BLD AUTO: 2.3 X10 (3) UL (ref 1.5–7.7)
NEUTROPHILS # BLD AUTO: 2.3 X10(3) UL (ref 1.5–7.7)
NEUTROPHILS NFR BLD AUTO: 45.5 %
NONHDLC SERPL-MCNC: 177 MG/DL (ref ?–130)
OSMOLALITY SERPL CALC.SUM OF ELEC: 287 MOSM/KG (ref 275–295)
P AXIS: 44 DEGREES
P-R INTERVAL: 182 MS
PLATELET # BLD AUTO: 305 10(3)UL (ref 150–450)
POTASSIUM SERPL-SCNC: 3.7 MMOL/L (ref 3.5–5.1)
PROLACTIN SERPL-MCNC: 115.3 NG/ML
PROT SERPL-MCNC: 7 G/DL (ref 5.7–8.2)
Q-T INTERVAL: 370 MS
QRS DURATION: 76 MS
QTC CALCULATION (BEZET): 407 MS
R AXIS: 5 DEGREES
RBC # BLD AUTO: 4.22 X10(6)UL (ref 3.8–5.3)
SODIUM SERPL-SCNC: 139 MMOL/L (ref 136–145)
T AXIS: -1 DEGREES
TOTAL IRON BINDING CAPACITY: 264 UG/DL (ref 250–425)
TRANSFERRIN SERPL-MCNC: 200 MG/DL (ref 250–380)
TRIGL SERPL-MCNC: 88 MG/DL (ref 30–149)
TSI SER-ACNC: 2.22 UIU/ML (ref 0.55–4.78)
VENTRICULAR RATE: 73 BPM
VIT B12 SERPL-MCNC: 462 PG/ML (ref 211–911)
VIT D+METAB SERPL-MCNC: 22.2 NG/ML (ref 30–100)
VLDLC SERPL CALC-MCNC: 17 MG/DL (ref 0–30)
WBC # BLD AUTO: 5.1 X10(3) UL (ref 4–11)

## 2025-05-19 PROCEDURE — 82746 ASSAY OF FOLIC ACID SERUM: CPT | Performed by: FAMILY MEDICINE

## 2025-05-19 PROCEDURE — 85025 COMPLETE CBC W/AUTO DIFF WBC: CPT | Performed by: FAMILY MEDICINE

## 2025-05-19 PROCEDURE — 36415 COLL VENOUS BLD VENIPUNCTURE: CPT | Performed by: FAMILY MEDICINE

## 2025-05-19 PROCEDURE — 83550 IRON BINDING TEST: CPT | Performed by: FAMILY MEDICINE

## 2025-05-19 PROCEDURE — 82306 VITAMIN D 25 HYDROXY: CPT

## 2025-05-19 PROCEDURE — 83540 ASSAY OF IRON: CPT | Performed by: FAMILY MEDICINE

## 2025-05-19 PROCEDURE — 83036 HEMOGLOBIN GLYCOSYLATED A1C: CPT | Performed by: FAMILY MEDICINE

## 2025-05-19 PROCEDURE — 84443 ASSAY THYROID STIM HORMONE: CPT | Performed by: FAMILY MEDICINE

## 2025-05-19 PROCEDURE — 84146 ASSAY OF PROLACTIN: CPT | Performed by: FAMILY MEDICINE

## 2025-05-19 PROCEDURE — 80053 COMPREHEN METABOLIC PANEL: CPT | Performed by: FAMILY MEDICINE

## 2025-05-19 PROCEDURE — 82607 VITAMIN B-12: CPT | Performed by: FAMILY MEDICINE

## 2025-05-19 PROCEDURE — 80061 LIPID PANEL: CPT | Performed by: FAMILY MEDICINE

## 2025-05-19 RX ORDER — TOPIRAMATE 50 MG/1
TABLET, FILM COATED ORAL
COMMUNITY
Start: 2025-02-12

## 2025-05-19 RX ORDER — TIRZEPATIDE 10 MG/.5ML
10 INJECTION, SOLUTION SUBCUTANEOUS WEEKLY
COMMUNITY
Start: 2025-04-24

## 2025-05-20 ENCOUNTER — HOSPITAL ENCOUNTER (OUTPATIENT)
Dept: CV DIAGNOSTICS | Facility: HOSPITAL | Age: 55
Discharge: HOME | End: 2025-05-20
Attending: FAMILY MEDICINE
Payer: COMMERCIAL

## 2025-05-20 ENCOUNTER — PATIENT MESSAGE (OUTPATIENT)
Dept: INTERNAL MEDICINE CLINIC | Facility: CLINIC | Age: 55
End: 2025-05-20

## 2025-05-20 DIAGNOSIS — D50.9 IRON DEFICIENCY ANEMIA, UNSPECIFIED IRON DEFICIENCY ANEMIA TYPE: Primary | ICD-10-CM

## 2025-05-20 DIAGNOSIS — R53.83 FATIGUE, UNSPECIFIED TYPE: ICD-10-CM

## 2025-05-20 DIAGNOSIS — I99.8 BLOOD PRESSURE INSTABILITY: ICD-10-CM

## 2025-05-20 DIAGNOSIS — E78.00 PURE HYPERCHOLESTEROLEMIA: ICD-10-CM

## 2025-05-20 PROCEDURE — 93306 TTE W/DOPPLER COMPLETE: CPT | Performed by: FAMILY MEDICINE

## 2025-05-29 ENCOUNTER — PATIENT MESSAGE (OUTPATIENT)
Dept: NEUROLOGY | Facility: CLINIC | Age: 55
End: 2025-05-29

## 2025-05-29 DIAGNOSIS — G43.709 CHRONIC MIGRAINE W/O AURA W/O STATUS MIGRAINOSUS, NOT INTRACTABLE: ICD-10-CM

## 2025-05-29 DIAGNOSIS — B00.9 HERPES SIMPLEX VIRUS (HSV) INFECTION: ICD-10-CM

## 2025-05-29 RX ORDER — CABERGOLINE 0.5 MG/1
TABLET ORAL
Refills: 0 | Status: CANCELLED | OUTPATIENT
Start: 2025-05-29

## 2025-05-29 RX ORDER — VALACYCLOVIR HYDROCHLORIDE 500 MG/1
500 TABLET, FILM COATED ORAL DAILY
Qty: 90 TABLET | Refills: 0 | Status: SHIPPED | OUTPATIENT
Start: 2025-05-29

## 2025-05-29 RX ORDER — NARATRIPTAN 2.5 MG/1
2.5 TABLET ORAL AS NEEDED
Qty: 10 TABLET | Refills: 1 | Status: SHIPPED | OUTPATIENT
Start: 2025-05-29

## 2025-05-29 RX ORDER — METHYLPREDNISOLONE 4 MG/1
TABLET ORAL
Qty: 21 EACH | Refills: 0 | Status: SHIPPED | OUTPATIENT
Start: 2025-05-29

## 2025-05-29 NOTE — TELEPHONE ENCOUNTER
Medication: naratriptan 2.5     Date of last refill: 07/08/2024 (#10/1)  [place]     Last office visit: 08/29/2024  Due back to clinic per last office note:  1 year  Date next office visit scheduled:    No future appointments.        Last OV note recommendation:    Stable overall     Continue  Ajovy for prevention  Continue naratriptan  Continue Topamax 50 mg AM and 100 mg PM        Pituitary adenoma- on Cabergoline  Follows with [facility]

## 2025-08-20 DIAGNOSIS — E78.00 PURE HYPERCHOLESTEROLEMIA: ICD-10-CM

## 2025-08-21 RX ORDER — ROSUVASTATIN CALCIUM 10 MG/1
10 TABLET, COATED ORAL DAILY
Qty: 90 TABLET | Refills: 0 | Status: SHIPPED | OUTPATIENT
Start: 2025-08-21

## (undated) DIAGNOSIS — F41.9 ANXIETY: ICD-10-CM

## (undated) DIAGNOSIS — G43.009 MIGRAINE WITHOUT AURA AND WITHOUT STATUS MIGRAINOSUS, NOT INTRACTABLE: Primary | ICD-10-CM

## (undated) DIAGNOSIS — G43.009 MIGRAINE WITHOUT AURA AND WITHOUT STATUS MIGRAINOSUS, NOT INTRACTABLE: ICD-10-CM

## (undated) DIAGNOSIS — G43.709 CHRONIC MIGRAINE W/O AURA W/O STATUS MIGRAINOSUS, NOT INTRACTABLE: Primary | ICD-10-CM

## (undated) DIAGNOSIS — D35.3 BENIGN NEOPLASM OF PITUITARY GLAND AND CRANIOPHARYNGEAL DUCT (POUCH) (HCC): ICD-10-CM

## (undated) DIAGNOSIS — D35.2 BENIGN NEOPLASM OF PITUITARY GLAND AND CRANIOPHARYNGEAL DUCT (POUCH) (HCC): ICD-10-CM

## (undated) DEVICE — MEDI-VAC NON-CONDUCTIVE SUCTION TUBING: Brand: CARDINAL HEALTH

## (undated) DEVICE — 1200CC GUARDIAN II: Brand: GUARDIAN

## (undated) DEVICE — MEDI-VAC SUCTION HANDLE REGULAR CAPACITY: Brand: CARDINAL HEALTH

## (undated) DEVICE — Device: Brand: DEFENDO AIR/WATER/SUCTION AND BIOPSY VALVE

## (undated) DEVICE — ENDOSCOPY PACK - LOWER: Brand: MEDLINE INDUSTRIES, INC.

## (undated) DEVICE — FILTERLINE NASAL ADULT O2/CO2

## (undated) NOTE — LETTER
12/27/2021    Para Boot  7/10/1970      To Whom it May Concern: This letter has been written at the request of the patient.  The above patient was seen at the [facility] for treatment for migraines on 12/14/21 and then followed up in

## (undated) NOTE — LETTER
Date: 12/23/2021    Patient Name: Tye Martinez          To Whom it may concern:     This letter has been written at the patient's request. The above patient was seen at the [facility] for treatment for migraines    Patient was released to work

## (undated) NOTE — LETTER
Date: 5/4/2022    Patient Name: Lo Spears  07/10/1970        To Whom it may concern: This letter has been written at the patient's request. The above patient was seen at the [facility] for treatment of a medical condition. The patient may return to work/school on Monday 5/9/22.         Sincerely,    Nestor Rajan MD

## (undated) NOTE — MR AVS SNAPSHOT
[address] [address]               Thank you for choosing us for your health care visit with Siri Cruz MD.  We are glad to serve you and happy to provide you with this ? If your prescription is due for a refill, you may be due for a follow up appointment. ? To best provide you care, patients receiving routine medications need to be seen at least once a year.      protocol for controlled substances:  Written prescr understanding in the matter.          Allergies as of Feb 21, 2017     No Known Allergies                Today's Vital Signs     BP Pulse Weight             110/70 mmHg 80 311 lb            Current Medications          This list is accurate as of: 2/21/17 1 Medications Administered in the Office Today     dexamethasone PF (DECADRON) 10 MG/ML injection 10 mg                  ketorolac tromethamine (TORADOL) 30 MG/ML injection 30 mg                    MyChart     Visit MyChart  You can access your MyChart to

## (undated) NOTE — MR AVS SNAPSHOT
[address], [place] [address] [phone]               Thank you for choosing us for your health care visit with Marisa Currie MD.  We are glad to serve you and happy to provide you with this  protocol for controlled substances:  Written prescriptions      ? EFFECTIVE April 1, 2017 PATIENTS MUST  THEIR OWN NARCOTIC PRESCRIPTIONS. ? Written prescriptions must be picked up in office. ?  Please allow the office 2-3 business days to This list is accurate as of: 5/24/17 11:59 PM.  Always use your most recent med list.                atorvastatin 20 MG Tabs   TAKE 1 TABLET BY MOUTH NIGHTLY.    Commonly known as:  LIPITOR           diazepam 5 MG Tabs   TAKE 1 TABLET BY MOUTH EVERY 8 HOURS dexamethasone PF (DECADRON) 10 MG/ML injection 10 mg                  ketorolac tromethamine (TORADOL) 30 MG/ML injection 30 mg                    MyChart     Visit MyChart  You can access your MyChart to more actively manage your health care and view mor

## (undated) NOTE — LETTER
Date: 12/5/2022    Patient Name: Dianne Espinal          To Whom it may concern: The above patient was seen at the [facility] for treatment of a medical condition- Sinusitis. This patient should be excused from attending work  from 11/28/22 through 12/6/22. The patient may return to work on 12/7/22 with the following limitations none.         Sincerely,          David Gallo, APRN

## (undated) NOTE — MR AVS SNAPSHOT
[place]  [address] [facility] [address]  [address] [facility] [address] [phone]               Thank you for choosing us for your health care visit with Katie Pal NP.   We are glad to serve you and happy to provide you with this sum ondansetron 4 MG Tbdp   Take 1 tablet (4 mg total) by mouth every 8 (eight) hours as needed for Nausea. Commonly known as:  ZOFRAN-ODT           Pravastatin Sodium 20 MG Tabs   Take 1 tablet (20 mg total) by mouth daily.    Commonly known as:  PRA Choose whole grain products Foods high in sodium   Water is best for hydration Fast food.    Eat at home when possible     Tips for increasing your physical activity – Adults who are physically active are less likely to develop some chronic diseases than ad

## (undated) NOTE — LETTER
Date: 1/29/2020    Patient Name: Cindy Lin          To Whom it may concern: This letter has been written at the patient's request. The above patient was seen at the [facility] for treatment of Migraines. This patient does experience periodic and unpredictable migraine flare ups. During these flare ups patient can also experience a many other symptoms that can make day to day activities difficult. Please allow this patient to work from home if able, 1 to 2 times per week during a migraine flare up starting 01/29/2020 until 04/29/2020.       Sincerely,        Gigi Zamora MD

## (undated) NOTE — LETTER
01/19/21        [address] [facility] [address] [address]  [address]      Dear Felton Mitchell,    [address] [facility] records indicate that you have outstanding lab work and or testing that was ordered for you and has not yet been completed:  Orders Placed This Encounter

## (undated) NOTE — LETTER
Date & Time: 8/9/2022, 11:18 AM  Patient: Dorina Escobar  Encounter Provider(s):    Leeroy Godinez MD       To Whom It May Concern:    Dorina Escobar was seen and treated in our department on 8/9/2022. She should not return to work until 2 days.     If you have any questions or concerns, please do not hesitate to call.        _____________________________  Physician/APC Signature

## (undated) NOTE — ED AVS SNAPSHOT
Ld Files   MRN: [id number]    Department:  [address] in [place]   Date of Visit:  3/7/2020           Disclosure     Insurance plans vary and the physician(s) referred by the ER may not be covered by your plan.  Please contact tell this physician (or your personal doctor if your instructions are to return to your personal doctor) about any new or lasting problems. The primary care or specialist physician will see patients referred from the [facility] Emergency Department.  Annel Al

## (undated) NOTE — LETTER
Date: 1/29/2020    Patient Name: Melody Jesus          To Whom it may concern:     This letter has been written at the patient's request. The above patient was seen at the [facility] for migraines    Due to patient's active migraine we reque

## (undated) NOTE — LETTER
12/27/2021    Prince Padilla  7/10/1970      To Whom it may concern:     This letter has been written at the patient's request. The above patient is seen in my office for treatment of Migraines.  The patient should work from home from 12/27/21-1/8/22.

## (undated) NOTE — LETTER
Date: 9/6/2022    Patient Name: Christian Hopkins          To Whom it may concern: This letter has been written at the patient's request. The above patient was seen at the [facility] for treatment of a medical condition. This patient should be excused from attending work for September 6th, 2022. The patient may return to work on September 7th, 2022 without limitations.         Sincerely,            LUIS Weinberg

## (undated) NOTE — LETTER
Date: 5/26/2022      Patient Name: Sima Lorenzana          To Whom it may concern: The above patient was seen at the [facility] for treatment of a medical condition. This patient should be excused from attending work until 5/31/22.       Sincerely,      Tiffanie Meyer MD

## (undated) NOTE — MR AVS SNAPSHOT
[place]  [address] [facility] [address] [facility] [address]  [address] [facility] [id number] [phone]               Thank you for choosing us for your health care visit with Radha Villa NP.   We are glad to serve you and happy to provide you with this sum Commonly known as:  MIDRIN           MULTIVITAMIN & MINERAL OR   Take 1 tablet by mouth daily. ondansetron 4 MG Tbdp   Take 1 tablet (4 mg total) by mouth every 8 (eight) hours as needed for Nausea.    Commonly known as:  ZOFRAN-ODT           topi

## (undated) NOTE — LETTER
Date & Time: 8/25/2022, 12:57 PM  Patient: Reese Oneill  Encounter Provider(s):    LUIS Miranda       To Whom It May Concern:    Reese Oneill was seen and treated in our department on 8/25/2022. She should not return to work until 08/29/2022. If you have any questions or concerns, please do not hesitate to call.       Trina SMITH   Nurse Practitioner

## (undated) NOTE — LETTER
Date: 1/3/2023    Patient Name: Tai Drew          To Whom it may concern: The above patient was seen at the [facility] for treatment of a medical condition-Sinusitis/bronchitis. This patient should be excused from attending work/school from 1/3/23 through 1/4/23. The patient may return to work/school on 1/5/23 with the following limitations none.         Sincerely,          Adriana Spencer APRN

## (undated) NOTE — LETTER
10/22/20        [address] [facility]  [address] [facility] [address]      Dear Meme Garcia,    [facility] [facility] records indicate that you have outstanding lab work and or testing that was ordered for you and has not yet been completed:      [address] -

## (undated) NOTE — LETTER
06/14/19        [address] [facility]  [address] [facility]      Dear Maximiliano Pham,    [address] [facility] records indicate that you have outstanding lab work and or testing that was ordered for you and has not yet been completed: Mammogram, Ultrasound and Fasting